# Patient Record
Sex: FEMALE | Race: WHITE | NOT HISPANIC OR LATINO | Employment: FULL TIME
[De-identification: names, ages, dates, MRNs, and addresses within clinical notes are randomized per-mention and may not be internally consistent; named-entity substitution may affect disease eponyms.]

---

## 2017-01-02 ENCOUNTER — CHARTING TRANS (OUTPATIENT)
Dept: OTHER | Age: 42
End: 2017-01-02

## 2017-05-02 ENCOUNTER — CHARTING TRANS (OUTPATIENT)
Dept: OTHER | Age: 42
End: 2017-05-02

## 2017-06-27 ENCOUNTER — HOSPITAL (OUTPATIENT)
Dept: OTHER | Age: 42
End: 2017-06-27
Attending: OBSTETRICS & GYNECOLOGY

## 2017-07-13 ENCOUNTER — PRIOR ORIGINAL RECORDS (OUTPATIENT)
Dept: OTHER | Age: 42
End: 2017-07-13

## 2017-07-13 ENCOUNTER — HOSPITAL (OUTPATIENT)
Dept: OTHER | Age: 42
End: 2017-07-13
Attending: RADIOLOGY

## 2017-07-17 ENCOUNTER — LAB SERVICES (OUTPATIENT)
Dept: OTHER | Age: 42
End: 2017-07-17

## 2017-07-17 ENCOUNTER — HOSPITAL (OUTPATIENT)
Dept: OTHER | Age: 42
End: 2017-07-17
Attending: INTERNAL MEDICINE

## 2017-07-17 LAB
ALBUMIN SERPL-MCNC: 3.7 GM/DL (ref 3.6–5.1)
ALBUMIN/GLOB SERPL: 0.8 {RATIO} (ref 1–2.4)
ALP SERPL-CCNC: 128 UNIT/L (ref 45–117)
ALT SERPL-CCNC: 31 UNIT/L
ANALYZER ANC (IANC): NORMAL
ANION GAP SERPL CALC-SCNC: 13 MMOL/L (ref 10–20)
AST SERPL-CCNC: 24 UNIT/L
BASOPHILS # BLD: 0 THOUSAND/MCL (ref 0–0.3)
BASOPHILS NFR BLD: 0 %
BILIRUB SERPL-MCNC: 0.4 MG/DL (ref 0.2–1)
BUN SERPL-MCNC: 14 MG/DL (ref 6–20)
BUN/CREAT SERPL: 20 (ref 7–25)
CALCIUM SERPL-MCNC: 9.8 MG/DL (ref 8.4–10.2)
CHLORIDE: 105 MMOL/L (ref 98–107)
CO2 SERPL-SCNC: 28 MMOL/L (ref 21–32)
CREAT SERPL-MCNC: 0.7 MG/DL (ref 0.51–0.95)
DIFFERENTIAL METHOD BLD: NORMAL
EOSINOPHIL # BLD: 0.1 THOUSAND/MCL (ref 0.1–0.5)
EOSINOPHIL NFR BLD: 1 %
ERYTHROCYTE [DISTWIDTH] IN BLOOD: 13.2 % (ref 11–15)
GLOBULIN SER-MCNC: 4.4 GM/DL (ref 2–4)
GLUCOSE SERPL-MCNC: 97 MG/DL (ref 65–99)
HEMATOCRIT: 42 % (ref 36–46.5)
HGB BLD-MCNC: 14.5 GM/DL (ref 12–15.5)
LENGTH OF FAST TIME PATIENT: ABNORMAL HR
LYMPHOCYTES # BLD: 1.9 THOUSAND/MCL (ref 1–4.8)
LYMPHOCYTES NFR BLD: 24 %
MCH RBC QN AUTO: 30.2 PG (ref 26–34)
MCHC RBC AUTO-ENTMCNC: 34.5 GM/DL (ref 32–36.5)
MCV RBC AUTO: 87.5 FL (ref 78–100)
MONOCYTES # BLD: 0.5 THOUSAND/MCL (ref 0.3–0.9)
MONOCYTES NFR BLD: 6 %
NEUTROPHILS # BLD: 5.4 THOUSAND/MCL (ref 1.8–7.7)
NEUTROPHILS NFR BLD: 69 %
NEUTS SEG NFR BLD: NORMAL %
PERCENT NRBC: NORMAL
PLATELET # BLD: 285 THOUSAND/MCL (ref 140–450)
POTASSIUM SERPL-SCNC: 4 MMOL/L (ref 3.4–5.1)
PROT SERPL-MCNC: 8.1 GM/DL (ref 6.4–8.2)
RBC # BLD: 4.8 MILLION/MCL (ref 4–5.2)
SODIUM SERPL-SCNC: 142 MMOL/L (ref 135–145)
WBC # BLD: 7.9 THOUSAND/MCL (ref 4.2–11)

## 2017-07-18 LAB
ALBUMIN SERPL-MCNC: 3.7 G/DL (ref 3.6–5.1)
ALBUMIN/GLOB SERPL: 0.8 (ref 1–2.4)
ALP SERPL-CCNC: 128 UNITS/L (ref 45–117)
ALT SERPL-CCNC: 31 UNITS/L
ANALYZER ANC (IANC): NORMAL
ANION GAP SERPL CALC-SCNC: 13 MMOL/L (ref 10–20)
AST SERPL-CCNC: 24 UNITS/L
BASOPHILS # BLD: 0 K/MCL (ref 0–0.3)
BASOPHILS NFR BLD: 0 %
BILIRUB SERPL-MCNC: 0.4 MG/DL (ref 0.2–1)
BUN SERPL-MCNC: 14 MG/DL (ref 6–20)
BUN/CREAT SERPL: 20 (ref 7–25)
CALCIUM SERPL-MCNC: 9.8 MG/DL (ref 8.4–10.2)
CHLORIDE SERPL-SCNC: 105 MMOL/L (ref 98–107)
CO2 SERPL-SCNC: 28 MMOL/L (ref 21–32)
CREAT SERPL-MCNC: 0.7 MG/DL (ref 0.51–0.95)
DIFFERENTIAL METHOD BLD: NORMAL
EOSINOPHIL # BLD: 0.1 K/MCL (ref 0.1–0.5)
EOSINOPHIL NFR BLD: 1 %
ERYTHROCYTE [DISTWIDTH] IN BLOOD: 13.2 % (ref 11–15)
GLOBULIN SER-MCNC: 4.4 G/DL (ref 2–4)
GLUCOSE SERPL-MCNC: 97 MG/DL (ref 65–99)
HEMATOCRIT: 42 % (ref 36–46.5)
HEMOGLOBIN: 14.5 G/DL (ref 12–15.5)
LENGTH OF FAST TIME PATIENT: ABNORMAL HRS
LYMPHOCYTES # BLD: 1.9 K/MCL (ref 1–4.8)
LYMPHOCYTES NFR BLD: 24 %
MEAN CORPUSCULAR HEMOGLOBIN: 30.2 PG (ref 26–34)
MEAN CORPUSCULAR HGB CONC: 34.5 G/DL (ref 32–36.5)
MEAN CORPUSCULAR VOLUME: 87.5 FL (ref 78–100)
MONOCYTES # BLD: 0.5 K/MCL (ref 0.3–0.9)
MONOCYTES NFR BLD: 6 %
NEUTROPHILS # BLD: 5.4 K/MCL (ref 1.8–7.7)
NEUTROPHILS NFR BLD: 69 %
NEUTS SEG NFR BLD: NORMAL
NRBC (NRBCRE): NORMAL
PLATELET COUNT: 285 K/MCL (ref 140–450)
POTASSIUM SERPL-SCNC: 4 MMOL/L (ref 3.4–5.1)
RED CELL COUNT: 4.8 MIL/MCL (ref 4–5.2)
SODIUM SERPL-SCNC: 142 MMOL/L (ref 135–145)
TOTAL PROTEIN: 8.1 G/DL (ref 6.4–8.2)
WHITE BLOOD COUNT: 7.9 K/MCL (ref 4.2–11)

## 2017-07-27 ENCOUNTER — HOSPITAL (OUTPATIENT)
Dept: OTHER | Age: 42
End: 2017-07-27
Attending: RADIOLOGY

## 2017-07-31 ENCOUNTER — PRIOR ORIGINAL RECORDS (OUTPATIENT)
Dept: OTHER | Age: 42
End: 2017-07-31

## 2017-08-01 ENCOUNTER — HOSPITAL (OUTPATIENT)
Dept: OTHER | Age: 42
End: 2017-08-01
Attending: RADIOLOGY

## 2017-08-07 ENCOUNTER — PRIOR ORIGINAL RECORDS (OUTPATIENT)
Dept: OTHER | Age: 42
End: 2017-08-07

## 2017-08-10 ENCOUNTER — PRIOR ORIGINAL RECORDS (OUTPATIENT)
Dept: OTHER | Age: 42
End: 2017-08-10

## 2017-08-17 ENCOUNTER — PRIOR ORIGINAL RECORDS (OUTPATIENT)
Dept: OTHER | Age: 42
End: 2017-08-17

## 2017-08-18 ENCOUNTER — PRIOR ORIGINAL RECORDS (OUTPATIENT)
Dept: OTHER | Age: 42
End: 2017-08-18

## 2017-08-21 ENCOUNTER — PRIOR ORIGINAL RECORDS (OUTPATIENT)
Dept: OTHER | Age: 42
End: 2017-08-21

## 2017-08-23 ENCOUNTER — PRIOR ORIGINAL RECORDS (OUTPATIENT)
Dept: OTHER | Age: 42
End: 2017-08-23

## 2017-08-28 ENCOUNTER — PRIOR ORIGINAL RECORDS (OUTPATIENT)
Dept: OTHER | Age: 42
End: 2017-08-28

## 2017-08-29 ENCOUNTER — PRIOR ORIGINAL RECORDS (OUTPATIENT)
Dept: OTHER | Age: 42
End: 2017-08-29

## 2017-09-01 ENCOUNTER — HOSPITAL (OUTPATIENT)
Dept: OTHER | Age: 42
End: 2017-09-01
Attending: RADIOLOGY

## 2017-09-05 ENCOUNTER — PRIOR ORIGINAL RECORDS (OUTPATIENT)
Dept: OTHER | Age: 42
End: 2017-09-05

## 2017-09-07 ENCOUNTER — PRIOR ORIGINAL RECORDS (OUTPATIENT)
Dept: OTHER | Age: 42
End: 2017-09-07

## 2017-09-11 ENCOUNTER — PRIOR ORIGINAL RECORDS (OUTPATIENT)
Dept: OTHER | Age: 42
End: 2017-09-11

## 2017-09-12 ENCOUNTER — PRIOR ORIGINAL RECORDS (OUTPATIENT)
Dept: OTHER | Age: 42
End: 2017-09-12

## 2017-09-14 ENCOUNTER — PRIOR ORIGINAL RECORDS (OUTPATIENT)
Dept: OTHER | Age: 42
End: 2017-09-14

## 2017-09-18 ENCOUNTER — PRIOR ORIGINAL RECORDS (OUTPATIENT)
Dept: OTHER | Age: 42
End: 2017-09-18

## 2017-09-21 ENCOUNTER — CHARTING TRANS (OUTPATIENT)
Dept: OTHER | Age: 42
End: 2017-09-21

## 2017-10-01 ENCOUNTER — HOSPITAL (OUTPATIENT)
Dept: OTHER | Age: 42
End: 2017-10-01
Attending: RADIOLOGY

## 2017-10-02 ENCOUNTER — PRIOR ORIGINAL RECORDS (OUTPATIENT)
Dept: OTHER | Age: 42
End: 2017-10-02

## 2018-01-02 ENCOUNTER — HOSPITAL (OUTPATIENT)
Dept: OTHER | Age: 43
End: 2018-01-02
Attending: PHYSICIAN ASSISTANT

## 2018-04-02 ENCOUNTER — HOSPITAL (OUTPATIENT)
Dept: OTHER | Age: 43
End: 2018-04-02
Attending: RADIOLOGY

## 2018-04-02 ENCOUNTER — PRIOR ORIGINAL RECORDS (OUTPATIENT)
Dept: OTHER | Age: 43
End: 2018-04-02

## 2018-04-11 ENCOUNTER — IMAGING SERVICES (OUTPATIENT)
Dept: OTHER | Age: 43
End: 2018-04-11

## 2018-04-11 ENCOUNTER — HOSPITAL (OUTPATIENT)
Dept: OTHER | Age: 43
End: 2018-04-11
Attending: INTERNAL MEDICINE

## 2018-04-29 ENCOUNTER — CHARTING TRANS (OUTPATIENT)
Dept: OTHER | Age: 43
End: 2018-04-29

## 2018-07-03 ENCOUNTER — HOSPITAL (OUTPATIENT)
Dept: OTHER | Age: 43
End: 2018-07-03
Attending: OBSTETRICS & GYNECOLOGY

## 2018-10-01 ENCOUNTER — PRIOR ORIGINAL RECORDS (OUTPATIENT)
Dept: OTHER | Age: 43
End: 2018-10-01

## 2018-10-01 ENCOUNTER — HOSPITAL (OUTPATIENT)
Dept: OTHER | Age: 43
End: 2018-10-01
Attending: RADIOLOGY

## 2018-11-03 VITALS
WEIGHT: 274 LBS | HEIGHT: 65 IN | BODY MASS INDEX: 45.65 KG/M2 | SYSTOLIC BLOOD PRESSURE: 130 MMHG | DIASTOLIC BLOOD PRESSURE: 90 MMHG | TEMPERATURE: 98.8 F | OXYGEN SATURATION: 98 % | RESPIRATION RATE: 18 BRPM | HEART RATE: 82 BPM

## 2018-12-01 ENCOUNTER — PRIOR ORIGINAL RECORDS (OUTPATIENT)
Dept: HEALTH INFORMATION MANAGEMENT | Facility: OTHER | Age: 43
End: 2018-12-01

## 2019-01-01 ENCOUNTER — EXTERNAL RECORD (OUTPATIENT)
Dept: HEALTH INFORMATION MANAGEMENT | Facility: OTHER | Age: 44
End: 2019-01-01

## 2019-02-23 ENCOUNTER — HOSPITAL (OUTPATIENT)
Dept: OTHER | Age: 44
End: 2019-02-23
Attending: OBSTETRICS & GYNECOLOGY

## 2019-04-04 ENCOUNTER — HOSPITAL (OUTPATIENT)
Dept: OTHER | Age: 44
End: 2019-04-04
Attending: RADIOLOGY

## 2019-04-22 ENCOUNTER — PRIOR ORIGINAL RECORDS (OUTPATIENT)
Dept: OTHER | Age: 44
End: 2019-04-22

## 2019-04-25 ENCOUNTER — HOSPITAL (OUTPATIENT)
Dept: OTHER | Age: 44
End: 2019-04-25
Attending: OBSTETRICS & GYNECOLOGY

## 2019-05-01 ENCOUNTER — HOSPITAL (OUTPATIENT)
Dept: OTHER | Age: 44
End: 2019-05-01
Attending: RADIOLOGY

## 2019-08-01 LAB
ANALYZER ANC (IANC): NORMAL
ERYTHROCYTE [DISTWIDTH] IN BLOOD: 13.2 % (ref 11–15)
HCT VFR BLD CALC: 38.7 % (ref 36–46.5)
HGB BLD-MCNC: 13.1 G/DL (ref 12–15.5)
INR PPP: 1
INR PPP: NORMAL
MCH RBC QN AUTO: 30.3 PG (ref 26–34)
MCHC RBC AUTO-ENTMCNC: 33.9 G/DL (ref 32–36.5)
MCV RBC AUTO: 89.6 FL (ref 78–100)
NRBC (NRBCRE): 0 /100 WBC
PLATELET # BLD: 250 K/MCL (ref 140–450)
PROTHROMBIN TIME (PRT2): NORMAL
PROTHROMBIN TIME: 10.5 SEC (ref 9.7–11.8)
RBC # BLD: 4.32 MIL/MCL (ref 4–5.2)
WBC # BLD: 5.1 K/MCL (ref 4.2–11)

## 2019-08-05 LAB — PATH REPORT, FNA: NORMAL

## 2019-08-09 ENCOUNTER — PRIOR ORIGINAL RECORDS (OUTPATIENT)
Dept: OTHER | Age: 44
End: 2019-08-09

## 2019-08-09 ENCOUNTER — HOSPITAL (OUTPATIENT)
Dept: OTHER | Age: 44
End: 2019-08-09
Attending: RADIOLOGY

## 2019-08-12 ENCOUNTER — HOSPITAL (OUTPATIENT)
Dept: OTHER | Age: 44
End: 2019-08-12
Attending: OBSTETRICS & GYNECOLOGY

## 2019-09-01 ENCOUNTER — HOSPITAL (OUTPATIENT)
Dept: OTHER | Age: 44
End: 2019-09-01
Attending: RADIOLOGY

## 2019-09-11 ENCOUNTER — HOSPITAL (OUTPATIENT)
Dept: OTHER | Age: 44
End: 2019-09-11
Attending: OBSTETRICS & GYNECOLOGY

## 2019-09-11 ENCOUNTER — PRIOR ORIGINAL RECORDS (OUTPATIENT)
Dept: OTHER | Age: 44
End: 2019-09-11

## 2019-09-26 ENCOUNTER — PRIOR ORIGINAL RECORDS (OUTPATIENT)
Dept: OTHER | Age: 44
End: 2019-09-26

## 2019-10-01 ENCOUNTER — HOSPITAL (OUTPATIENT)
Dept: OTHER | Age: 44
End: 2019-10-01
Attending: RADIOLOGY

## 2019-10-01 ENCOUNTER — PRIOR ORIGINAL RECORDS (OUTPATIENT)
Dept: OTHER | Age: 44
End: 2019-10-01

## 2019-10-07 ENCOUNTER — PRIOR ORIGINAL RECORDS (OUTPATIENT)
Dept: OTHER | Age: 44
End: 2019-10-07

## 2019-10-09 ENCOUNTER — PRIOR ORIGINAL RECORDS (OUTPATIENT)
Dept: OTHER | Age: 44
End: 2019-10-09

## 2019-10-14 ENCOUNTER — PRIOR ORIGINAL RECORDS (OUTPATIENT)
Dept: OTHER | Age: 44
End: 2019-10-14

## 2019-10-21 ENCOUNTER — PRIOR ORIGINAL RECORDS (OUTPATIENT)
Dept: OTHER | Age: 44
End: 2019-10-21

## 2019-10-28 ENCOUNTER — PRIOR ORIGINAL RECORDS (OUTPATIENT)
Dept: OTHER | Age: 44
End: 2019-10-28

## 2019-11-01 ENCOUNTER — HOSPITAL (OUTPATIENT)
Dept: OTHER | Age: 44
End: 2019-11-01
Attending: RADIOLOGY

## 2019-11-01 ENCOUNTER — PRIOR ORIGINAL RECORDS (OUTPATIENT)
Dept: OTHER | Age: 44
End: 2019-11-01

## 2019-11-04 ENCOUNTER — PRIOR ORIGINAL RECORDS (OUTPATIENT)
Dept: OTHER | Age: 44
End: 2019-11-04

## 2019-11-11 ENCOUNTER — PRIOR ORIGINAL RECORDS (OUTPATIENT)
Dept: OTHER | Age: 44
End: 2019-11-11

## 2019-12-01 ENCOUNTER — HOSPITAL (OUTPATIENT)
Dept: OTHER | Age: 44
End: 2019-12-01
Attending: RADIOLOGY

## 2019-12-02 ENCOUNTER — PRIOR ORIGINAL RECORDS (OUTPATIENT)
Dept: OTHER | Age: 44
End: 2019-12-02

## 2019-12-02 ENCOUNTER — OFFICE VISIT (OUTPATIENT)
Dept: PODIATRY CLINIC | Facility: CLINIC | Age: 44
End: 2019-12-02
Payer: COMMERCIAL

## 2019-12-02 VITALS — HEART RATE: 77 BPM | SYSTOLIC BLOOD PRESSURE: 130 MMHG | DIASTOLIC BLOOD PRESSURE: 88 MMHG | RESPIRATION RATE: 12 BRPM

## 2019-12-02 DIAGNOSIS — M79.672 INFLAMMATORY HEEL PAIN, LEFT: ICD-10-CM

## 2019-12-02 DIAGNOSIS — M72.2 PLANTAR FASCIITIS OF LEFT FOOT: Primary | ICD-10-CM

## 2019-12-02 DIAGNOSIS — M77.32 CALCANEAL SPUR OF LEFT FOOT: ICD-10-CM

## 2019-12-02 DIAGNOSIS — M79.672 ARCH PAIN, LEFT: ICD-10-CM

## 2019-12-02 PROCEDURE — 99203 OFFICE O/P NEW LOW 30 MIN: CPT | Performed by: PODIATRIST

## 2019-12-02 PROCEDURE — 20550 NJX 1 TENDON SHEATH/LIGAMENT: CPT | Performed by: PODIATRIST

## 2019-12-02 RX ORDER — LETROZOLE 2.5 MG/1
2.5 TABLET, FILM COATED ORAL
Refills: 4 | COMMUNITY
Start: 2019-11-07

## 2019-12-02 RX ORDER — TRIAMCINOLONE ACETONIDE 40 MG/ML
40 INJECTION, SUSPENSION INTRA-ARTICULAR; INTRAMUSCULAR ONCE
Status: DISCONTINUED | OUTPATIENT
Start: 2019-12-02 | End: 2019-12-02

## 2019-12-02 RX ORDER — TRIAMCINOLONE ACETONIDE 40 MG/ML
40 INJECTION, SUSPENSION INTRA-ARTICULAR; INTRAMUSCULAR ONCE
Status: COMPLETED | OUTPATIENT
Start: 2019-12-02 | End: 2019-12-02

## 2019-12-02 RX ADMIN — TRIAMCINOLONE ACETONIDE 40 MG: 40 INJECTION, SUSPENSION INTRA-ARTICULAR; INTRAMUSCULAR at 17:50:00

## 2019-12-03 ENCOUNTER — TELEPHONE (OUTPATIENT)
Dept: PODIATRY CLINIC | Facility: CLINIC | Age: 44
End: 2019-12-03

## 2019-12-03 NOTE — PROGRESS NOTES
Freddie Georges is a 38year old female. Patient presents with:  Consult: left heel pain -- Onset 2 weeks ago. States heels feels like a \"pebble in heel\". Foot is achy. No xrays taken. Rates pain 2/10 today. Tried icing BID.          HPI:   This pleasan denies chest pain on exertion  GI: denies abdominal pain and denies heartburn  NEURO: denies headaches    EXAM:   /88 (BP Location: Left arm, Patient Position: Sitting, Cuff Size: large)   Pulse 77   Resp 12   Physical Exam  GENERAL: well developed, Future        Plan: Today educated the patient about the problem ordered physical therapy and advised cortisone injection. The patient was consented for a cortisone injection and after timeout was taken the injection consisting of 40 mg Kenalog and 1.0 cc o

## 2019-12-03 NOTE — PROGRESS NOTES
Per Dr. Tyler Ortiz draw up 1 cc of 0.5 % Marcaine and 1 cc of Kenalog 40 for injection to left foot.   RR

## 2019-12-03 NOTE — TELEPHONE ENCOUNTER
LM for patient that the xray orders are entered and to get the xrays done on the way into her next appt.  pls call back with any questions

## 2019-12-21 ENCOUNTER — HOSPITAL ENCOUNTER (OUTPATIENT)
Dept: GENERAL RADIOLOGY | Age: 44
Discharge: HOME | End: 2019-12-21
Attending: PODIATRIST
Payer: COMMERCIAL

## 2019-12-21 DIAGNOSIS — M77.32 CALCANEAL SPUR OF LEFT FOOT: ICD-10-CM

## 2019-12-21 DIAGNOSIS — M79.672 INFLAMMATORY HEEL PAIN, LEFT: ICD-10-CM

## 2019-12-21 DIAGNOSIS — M79.672 ARCH PAIN, LEFT: ICD-10-CM

## 2019-12-21 DIAGNOSIS — M72.2 PLANTAR FASCIITIS OF LEFT FOOT: ICD-10-CM

## 2019-12-21 PROCEDURE — 73620 X-RAY EXAM OF FOOT: CPT | Performed by: PODIATRIST

## 2019-12-23 ENCOUNTER — HOSPITAL (OUTPATIENT)
Dept: OTHER | Age: 44
End: 2019-12-23
Attending: OBSTETRICS & GYNECOLOGY

## 2019-12-30 ENCOUNTER — TELEPHONE (OUTPATIENT)
Dept: PODIATRY CLINIC | Facility: CLINIC | Age: 44
End: 2019-12-30

## 2019-12-30 ENCOUNTER — OFFICE VISIT (OUTPATIENT)
Dept: PODIATRY CLINIC | Facility: CLINIC | Age: 44
End: 2019-12-30
Payer: COMMERCIAL

## 2019-12-30 DIAGNOSIS — M77.32 CALCANEAL SPUR OF LEFT FOOT: ICD-10-CM

## 2019-12-30 DIAGNOSIS — M79.672 INFLAMMATORY HEEL PAIN, LEFT: ICD-10-CM

## 2019-12-30 DIAGNOSIS — M79.672 ARCH PAIN, LEFT: ICD-10-CM

## 2019-12-30 DIAGNOSIS — M72.2 PLANTAR FASCIITIS OF LEFT FOOT: Primary | ICD-10-CM

## 2019-12-30 PROCEDURE — 99213 OFFICE O/P EST LOW 20 MIN: CPT | Performed by: PODIATRIST

## 2019-12-30 NOTE — TELEPHONE ENCOUNTER
Lm requesting call back about appt today.     (if pt calls back, she and her mother can come in as early as 26 d/t a cancellation.  They can come in as soon as possible)

## 2019-12-30 NOTE — TELEPHONE ENCOUNTER
Pt returned the call. Pt is unable to come in early today for the appointment. Pt is at work and after going to  her mother for their appointments.

## 2019-12-31 NOTE — PROGRESS NOTES
Kenzie Darnell is a 38year old female. Patient presents with: Follow - Up: LOV 12/2/19. pt had cortisone injection at LOV and foot pain is much better. feels more like a tired arch and tight calf muscle. 95% better.  0/10 pain,        HPI:   Patient com nails are of normal appearance. 2. Vascular: Patient has palpable dorsalis pedis posterior tibial pulses on the left   3. Neurologic: Patient has intact pain sensation   4.  Musculoskeletal: Patient has pain on palpation of the arch but only when stretche

## 2020-01-01 ENCOUNTER — EXTERNAL RECORD (OUTPATIENT)
Dept: HEALTH INFORMATION MANAGEMENT | Facility: OTHER | Age: 45
End: 2020-01-01

## 2020-01-01 ENCOUNTER — HOSPITAL (OUTPATIENT)
Dept: OTHER | Age: 45
End: 2020-01-01
Attending: RADIOLOGY

## 2020-01-01 ENCOUNTER — EXTERNAL RECORD (OUTPATIENT)
Dept: HEALTH INFORMATION MANAGEMENT | Age: 45
End: 2020-01-01

## 2020-01-07 ENCOUNTER — APPOINTMENT (OUTPATIENT)
Dept: PHYSICAL THERAPY | Age: 45
End: 2020-01-07
Attending: PODIATRIST
Payer: COMMERCIAL

## 2020-01-09 ENCOUNTER — APPOINTMENT (OUTPATIENT)
Dept: PHYSICAL THERAPY | Age: 45
End: 2020-01-09
Attending: PODIATRIST
Payer: COMMERCIAL

## 2020-01-14 ENCOUNTER — APPOINTMENT (OUTPATIENT)
Dept: PHYSICAL THERAPY | Age: 45
End: 2020-01-14
Attending: PODIATRIST
Payer: COMMERCIAL

## 2020-01-16 ENCOUNTER — APPOINTMENT (OUTPATIENT)
Dept: PHYSICAL THERAPY | Age: 45
End: 2020-01-16
Attending: PODIATRIST
Payer: COMMERCIAL

## 2020-01-21 ENCOUNTER — APPOINTMENT (OUTPATIENT)
Dept: PHYSICAL THERAPY | Age: 45
End: 2020-01-21
Attending: PODIATRIST
Payer: COMMERCIAL

## 2020-01-23 ENCOUNTER — APPOINTMENT (OUTPATIENT)
Dept: PHYSICAL THERAPY | Age: 45
End: 2020-01-23
Attending: PODIATRIST
Payer: COMMERCIAL

## 2020-01-28 ENCOUNTER — APPOINTMENT (OUTPATIENT)
Dept: PHYSICAL THERAPY | Age: 45
End: 2020-01-28
Attending: PODIATRIST
Payer: COMMERCIAL

## 2020-01-30 ENCOUNTER — APPOINTMENT (OUTPATIENT)
Dept: PHYSICAL THERAPY | Age: 45
End: 2020-01-30
Attending: PODIATRIST
Payer: COMMERCIAL

## 2020-02-09 VITALS
BODY MASS INDEX: 46.75 KG/M2 | HEIGHT: 64 IN | WEIGHT: 273.81 LBS | WEIGHT: 273.81 LBS | TEMPERATURE: 98.2 F | DIASTOLIC BLOOD PRESSURE: 74 MMHG | BODY MASS INDEX: 47 KG/M2 | SYSTOLIC BLOOD PRESSURE: 113 MMHG | RESPIRATION RATE: 18 BRPM | HEART RATE: 60 BPM

## 2020-02-09 VITALS
DIASTOLIC BLOOD PRESSURE: 78 MMHG | HEART RATE: 66 BPM | WEIGHT: 267.64 LBS | HEIGHT: 64 IN | RESPIRATION RATE: 20 BRPM | BODY MASS INDEX: 45.69 KG/M2 | SYSTOLIC BLOOD PRESSURE: 120 MMHG

## 2020-02-09 VITALS
RESPIRATION RATE: 18 BRPM | DIASTOLIC BLOOD PRESSURE: 81 MMHG | SYSTOLIC BLOOD PRESSURE: 123 MMHG | HEIGHT: 64 IN | WEIGHT: 275.58 LBS | HEART RATE: 82 BPM | BODY MASS INDEX: 47.05 KG/M2

## 2020-02-09 VITALS
WEIGHT: 272.05 LBS | SYSTOLIC BLOOD PRESSURE: 112 MMHG | TEMPERATURE: 98.2 F | RESPIRATION RATE: 20 BRPM | BODY MASS INDEX: 46.45 KG/M2 | HEART RATE: 84 BPM | DIASTOLIC BLOOD PRESSURE: 81 MMHG | HEIGHT: 64 IN

## 2020-02-09 VITALS
DIASTOLIC BLOOD PRESSURE: 77 MMHG | RESPIRATION RATE: 20 BRPM | HEART RATE: 62 BPM | BODY MASS INDEX: 45.5 KG/M2 | SYSTOLIC BLOOD PRESSURE: 137 MMHG | HEIGHT: 64 IN | WEIGHT: 266.54 LBS

## 2020-02-09 VITALS
DIASTOLIC BLOOD PRESSURE: 79 MMHG | BODY MASS INDEX: 46.75 KG/M2 | WEIGHT: 273.81 LBS | HEIGHT: 64 IN | TEMPERATURE: 98.2 F | SYSTOLIC BLOOD PRESSURE: 126 MMHG | HEART RATE: 67 BPM | RESPIRATION RATE: 19 BRPM

## 2020-02-09 VITALS
HEART RATE: 59 BPM | WEIGHT: 269.62 LBS | SYSTOLIC BLOOD PRESSURE: 135 MMHG | RESPIRATION RATE: 20 BRPM | BODY MASS INDEX: 44.87 KG/M2 | DIASTOLIC BLOOD PRESSURE: 79 MMHG

## 2020-02-09 VITALS
SYSTOLIC BLOOD PRESSURE: 136 MMHG | DIASTOLIC BLOOD PRESSURE: 79 MMHG | BODY MASS INDEX: 44.43 KG/M2 | WEIGHT: 266.98 LBS | HEART RATE: 61 BPM | RESPIRATION RATE: 20 BRPM

## 2020-02-09 VITALS — WEIGHT: 269.84 LBS | BODY MASS INDEX: 44.9 KG/M2

## 2020-02-09 VITALS
DIASTOLIC BLOOD PRESSURE: 56 MMHG | HEART RATE: 57 BPM | RESPIRATION RATE: 19 BRPM | HEIGHT: 64 IN | BODY MASS INDEX: 46.07 KG/M2 | WEIGHT: 269.84 LBS | SYSTOLIC BLOOD PRESSURE: 100 MMHG

## 2020-02-09 VITALS
BODY MASS INDEX: 45.99 KG/M2 | HEIGHT: 64 IN | SYSTOLIC BLOOD PRESSURE: 111 MMHG | RESPIRATION RATE: 20 BRPM | HEART RATE: 62 BPM | WEIGHT: 269.4 LBS | DIASTOLIC BLOOD PRESSURE: 68 MMHG

## 2020-02-09 VITALS — WEIGHT: 266.76 LBS | HEIGHT: 64 IN | BODY MASS INDEX: 45.54 KG/M2

## 2020-02-09 VITALS
BODY MASS INDEX: 46.71 KG/M2 | SYSTOLIC BLOOD PRESSURE: 146 MMHG | RESPIRATION RATE: 20 BRPM | HEIGHT: 64 IN | HEART RATE: 64 BPM | WEIGHT: 273.59 LBS | DIASTOLIC BLOOD PRESSURE: 87 MMHG

## 2020-02-09 VITALS
RESPIRATION RATE: 20 BRPM | BODY MASS INDEX: 47.27 KG/M2 | WEIGHT: 276.9 LBS | DIASTOLIC BLOOD PRESSURE: 88 MMHG | SYSTOLIC BLOOD PRESSURE: 136 MMHG | HEART RATE: 66 BPM | HEIGHT: 64 IN

## 2020-02-09 VITALS
HEART RATE: 75 BPM | DIASTOLIC BLOOD PRESSURE: 76 MMHG | SYSTOLIC BLOOD PRESSURE: 143 MMHG | RESPIRATION RATE: 20 BRPM | HEIGHT: 64 IN | BODY MASS INDEX: 45.5 KG/M2 | WEIGHT: 266.54 LBS

## 2020-02-09 VITALS
SYSTOLIC BLOOD PRESSURE: 136 MMHG | HEIGHT: 64 IN | HEART RATE: 66 BPM | BODY MASS INDEX: 46.75 KG/M2 | RESPIRATION RATE: 20 BRPM | DIASTOLIC BLOOD PRESSURE: 83 MMHG | TEMPERATURE: 98.2 F | WEIGHT: 273.81 LBS

## 2020-02-09 VITALS
WEIGHT: 269.4 LBS | SYSTOLIC BLOOD PRESSURE: 134 MMHG | HEART RATE: 66 BPM | BODY MASS INDEX: 45.99 KG/M2 | HEIGHT: 64 IN | DIASTOLIC BLOOD PRESSURE: 70 MMHG | RESPIRATION RATE: 20 BRPM

## 2020-02-09 VITALS
RESPIRATION RATE: 20 BRPM | BODY MASS INDEX: 45.62 KG/M2 | SYSTOLIC BLOOD PRESSURE: 129 MMHG | DIASTOLIC BLOOD PRESSURE: 71 MMHG | HEART RATE: 71 BPM | HEIGHT: 64 IN | WEIGHT: 267.2 LBS

## 2020-02-09 VITALS — WEIGHT: 122.4 LBS | BODY MASS INDEX: 20.37 KG/M2

## 2020-02-09 VITALS
HEART RATE: 64 BPM | RESPIRATION RATE: 19 BRPM | HEIGHT: 64 IN | WEIGHT: 266.54 LBS | BODY MASS INDEX: 45.5 KG/M2 | DIASTOLIC BLOOD PRESSURE: 81 MMHG | SYSTOLIC BLOOD PRESSURE: 120 MMHG

## 2020-02-09 VITALS
WEIGHT: 273.15 LBS | BODY MASS INDEX: 46.63 KG/M2 | RESPIRATION RATE: 20 BRPM | HEIGHT: 64 IN | DIASTOLIC BLOOD PRESSURE: 82 MMHG | HEART RATE: 63 BPM | SYSTOLIC BLOOD PRESSURE: 125 MMHG

## 2020-02-09 VITALS
SYSTOLIC BLOOD PRESSURE: 138 MMHG | WEIGHT: 125.6 LBS | RESPIRATION RATE: 20 BRPM | HEART RATE: 61 BPM | BODY MASS INDEX: 21.44 KG/M2 | DIASTOLIC BLOOD PRESSURE: 79 MMHG | HEIGHT: 64 IN

## 2020-02-09 VITALS
RESPIRATION RATE: 20 BRPM | HEART RATE: 77 BPM | BODY MASS INDEX: 47.8 KG/M2 | HEIGHT: 64 IN | SYSTOLIC BLOOD PRESSURE: 124 MMHG | WEIGHT: 279.98 LBS | DIASTOLIC BLOOD PRESSURE: 82 MMHG

## 2020-02-09 VITALS
DIASTOLIC BLOOD PRESSURE: 77 MMHG | HEART RATE: 80 BPM | BODY MASS INDEX: 45.5 KG/M2 | SYSTOLIC BLOOD PRESSURE: 134 MMHG | WEIGHT: 266.54 LBS | RESPIRATION RATE: 20 BRPM | HEIGHT: 64 IN

## 2020-02-09 VITALS — WEIGHT: 125.6 LBS | BODY MASS INDEX: 20.9 KG/M2

## 2020-07-31 ENCOUNTER — HOSPITAL (OUTPATIENT)
Dept: OTHER | Age: 45
End: 2020-07-31
Attending: RADIOLOGY

## 2020-08-01 ENCOUNTER — HOSPITAL (OUTPATIENT)
Dept: OTHER | Age: 45
End: 2020-08-01
Attending: RADIOLOGY

## 2020-08-31 ENCOUNTER — HOSPITAL ENCOUNTER (OUTPATIENT)
Dept: PET IMAGING | Age: 45
Discharge: HOME | End: 2020-08-31
Attending: OBSTETRICS & GYNECOLOGY

## 2020-08-31 DIAGNOSIS — C54.1 ENDOMETRIAL CANCER (CMD): ICD-10-CM

## 2020-08-31 PROCEDURE — 78815 PET IMAGE W/CT SKULL-THIGH: CPT

## 2020-08-31 PROCEDURE — A9552 F18 FDG: HCPCS

## 2020-08-31 PROCEDURE — 10006150 HB RX 343

## 2020-08-31 RX ORDER — FLUDEOXYGLUCOSE F-18 300 MCI/ML
12.56 INJECTION INTRAVENOUS ONCE
Status: COMPLETED | OUTPATIENT
Start: 2020-08-31 | End: 2020-08-31

## 2020-08-31 RX ADMIN — FLUDEOXYGLUCOSE F-18 12.56 MILLICURIE: 300 INJECTION INTRAVENOUS at 07:00

## 2020-10-08 ENCOUNTER — HOSPITAL ENCOUNTER (OUTPATIENT)
Dept: MAMMOGRAPHY | Age: 45
Discharge: HOME | End: 2020-10-08
Attending: OBSTETRICS & GYNECOLOGY

## 2020-10-08 DIAGNOSIS — Z12.31 ENCOUNTER FOR SCREENING MAMMOGRAM FOR MALIGNANT NEOPLASM OF BREAST: ICD-10-CM

## 2020-10-08 PROCEDURE — 77063 BREAST TOMOSYNTHESIS BI: CPT

## 2020-12-02 ENCOUNTER — TELEPHONE (OUTPATIENT)
Dept: SCHEDULING | Age: 45
End: 2020-12-02

## 2020-12-05 ENCOUNTER — APPOINTMENT (OUTPATIENT)
Dept: CT IMAGING | Age: 45
End: 2020-12-05
Attending: OBSTETRICS & GYNECOLOGY

## 2020-12-14 ENCOUNTER — HOSPITAL ENCOUNTER (OUTPATIENT)
Dept: CT IMAGING | Age: 45
Discharge: HOME | End: 2020-12-14
Attending: OBSTETRICS & GYNECOLOGY

## 2020-12-14 ENCOUNTER — HOSPITAL ENCOUNTER (OUTPATIENT)
Dept: LAB | Age: 45
Discharge: HOME | End: 2020-12-14
Attending: OBSTETRICS & GYNECOLOGY

## 2020-12-14 ENCOUNTER — APPOINTMENT (OUTPATIENT)
Dept: CT IMAGING | Age: 45
End: 2020-12-14
Attending: OBSTETRICS & GYNECOLOGY

## 2020-12-14 DIAGNOSIS — C54.1 ENDOMETRIAL ADENOCARCINOMA (CMD): ICD-10-CM

## 2020-12-14 DIAGNOSIS — C54.1 ENDOMETRIAL/UTERINE ADENOCARCINOMA (CMD): Primary | ICD-10-CM

## 2020-12-14 DIAGNOSIS — C54.1 ENDOMETRIAL/UTERINE ADENOCARCINOMA (CMD): ICD-10-CM

## 2020-12-14 LAB
BUN SERPL-MCNC: 15 MG/DL (ref 6–20)
BUN/CREAT SERPL: 20 (ref 7–25)
CREAT SERPL-MCNC: 0.74 MG/DL (ref 0.51–0.95)
GFR SERPLBLD BASED ON 1.73 SQ M-ARVRAT: >90 ML/MIN/1.73M2

## 2020-12-14 PROCEDURE — 84520 ASSAY OF UREA NITROGEN: CPT | Performed by: OBSTETRICS & GYNECOLOGY

## 2020-12-14 PROCEDURE — 10002805 HB CONTRAST AGENT: Performed by: OBSTETRICS & GYNECOLOGY

## 2020-12-14 PROCEDURE — 36415 COLL VENOUS BLD VENIPUNCTURE: CPT | Performed by: OBSTETRICS & GYNECOLOGY

## 2020-12-14 PROCEDURE — 74177 CT ABD & PELVIS W/CONTRAST: CPT

## 2020-12-14 PROCEDURE — 71260 CT THORAX DX C+: CPT

## 2020-12-14 RX ADMIN — IOHEXOL 100 ML: 350 INJECTION, SOLUTION INTRAVENOUS at 09:45

## 2021-01-01 ENCOUNTER — EXTERNAL RECORD (OUTPATIENT)
Dept: HEALTH INFORMATION MANAGEMENT | Facility: OTHER | Age: 46
End: 2021-01-01

## 2021-03-27 ENCOUNTER — HOSPITAL ENCOUNTER (OUTPATIENT)
Dept: CT IMAGING | Age: 46
Discharge: HOME | End: 2021-03-27
Attending: OBSTETRICS & GYNECOLOGY

## 2021-03-27 ENCOUNTER — HOSPITAL ENCOUNTER (OUTPATIENT)
Dept: LAB | Age: 46
Discharge: HOME | End: 2021-03-27
Attending: OBSTETRICS & GYNECOLOGY

## 2021-03-27 DIAGNOSIS — C54.1 ENDOMETRIAL/UTERINE ADENOCARCINOMA (CMD): Primary | ICD-10-CM

## 2021-03-27 DIAGNOSIS — C54.1 ENDOMETRIAL ADENOCARCINOMA (CMD): ICD-10-CM

## 2021-03-27 DIAGNOSIS — C54.1 ENDOMETRIAL/UTERINE ADENOCARCINOMA (CMD): ICD-10-CM

## 2021-03-27 LAB
BUN SERPL-MCNC: 16 MG/DL (ref 6–20)
BUN/CREAT SERPL: 21 (ref 7–25)
CREAT SERPL-MCNC: 0.77 MG/DL (ref 0.51–0.95)
GFR SERPLBLD BASED ON 1.73 SQ M-ARVRAT: >90 ML/MIN/1.73M2

## 2021-03-27 PROCEDURE — 82565 ASSAY OF CREATININE: CPT | Performed by: CLINICAL MEDICAL LABORATORY

## 2021-03-27 PROCEDURE — 36415 COLL VENOUS BLD VENIPUNCTURE: CPT | Performed by: CLINICAL MEDICAL LABORATORY

## 2021-03-27 PROCEDURE — 74177 CT ABD & PELVIS W/CONTRAST: CPT

## 2021-03-27 PROCEDURE — 84520 ASSAY OF UREA NITROGEN: CPT | Performed by: CLINICAL MEDICAL LABORATORY

## 2021-03-27 PROCEDURE — 10002805 HB CONTRAST AGENT: Performed by: OBSTETRICS & GYNECOLOGY

## 2021-03-27 RX ADMIN — IOHEXOL 100 ML: 350 INJECTION, SOLUTION INTRAVENOUS at 09:45

## 2021-04-08 ENCOUNTER — IMMUNIZATION (OUTPATIENT)
Dept: LAB | Age: 46
End: 2021-04-08

## 2021-04-08 DIAGNOSIS — Z23 NEED FOR VACCINATION: Primary | ICD-10-CM

## 2021-04-08 PROCEDURE — 0001A COVID 19 PFIZER-BIONTECH: CPT

## 2021-04-08 PROCEDURE — 91300 COVID 19 PFIZER-BIONTECH: CPT

## 2021-04-29 ENCOUNTER — IMMUNIZATION (OUTPATIENT)
Dept: LAB | Age: 46
End: 2021-04-29
Attending: HOSPITALIST

## 2021-04-29 DIAGNOSIS — Z23 NEED FOR VACCINATION: Primary | ICD-10-CM

## 2021-04-29 PROCEDURE — 91300 COVID 19 PFIZER-BIONTECH: CPT | Performed by: HOSPITALIST

## 2021-04-29 PROCEDURE — 0002A COVID 19 PFIZER-BIONTECH: CPT | Performed by: HOSPITALIST

## 2021-06-26 ENCOUNTER — HOSPITAL ENCOUNTER (OUTPATIENT)
Dept: CT IMAGING | Age: 46
Discharge: HOME | End: 2021-06-26
Attending: OBSTETRICS & GYNECOLOGY

## 2021-06-26 DIAGNOSIS — R91.8 PULMONARY MASS: ICD-10-CM

## 2021-06-26 DIAGNOSIS — C54.1 ENDOMETRIAL ADENOCARCINOMA (CMD): ICD-10-CM

## 2021-06-26 PROCEDURE — 10002805 HB CONTRAST AGENT: Performed by: OBSTETRICS & GYNECOLOGY

## 2021-06-26 PROCEDURE — 71260 CT THORAX DX C+: CPT

## 2021-06-26 RX ADMIN — IOHEXOL 85 ML: 350 INJECTION, SOLUTION INTRAVENOUS at 09:28

## 2021-08-08 ENCOUNTER — E-ADVICE (OUTPATIENT)
Dept: INTERNAL MEDICINE | Age: 46
End: 2021-08-08

## 2021-08-08 DIAGNOSIS — S63.609A SPRAIN OF THUMB, UNSPECIFIED LATERALITY, UNSPECIFIED SITE OF DIGIT, INITIAL ENCOUNTER: Primary | ICD-10-CM

## 2021-09-24 ENCOUNTER — HOSPITAL ENCOUNTER (OUTPATIENT)
Dept: CT IMAGING | Age: 46
Discharge: HOME | End: 2021-09-24
Attending: OBSTETRICS & GYNECOLOGY

## 2021-09-24 DIAGNOSIS — R22.2 MASS OF ANTERIOR ABDOMINAL WALL: ICD-10-CM

## 2021-09-24 DIAGNOSIS — C54.1 ENDOMETRIAL ADENOCARCINOMA (CMD): ICD-10-CM

## 2021-09-24 DIAGNOSIS — Z80.3 FAMILY HISTORY OF BREAST CANCER: ICD-10-CM

## 2021-09-24 DIAGNOSIS — R93.5 ABNORMAL CT OF THE ABDOMEN: ICD-10-CM

## 2021-09-24 PROCEDURE — 10002805 HB CONTRAST AGENT: Performed by: OBSTETRICS & GYNECOLOGY

## 2021-09-24 PROCEDURE — 74177 CT ABD & PELVIS W/CONTRAST: CPT

## 2021-09-24 PROCEDURE — 71260 CT THORAX DX C+: CPT

## 2021-09-24 RX ADMIN — IOHEXOL 100 ML: 350 INJECTION, SOLUTION INTRAVENOUS at 08:52

## 2021-11-13 ENCOUNTER — HOSPITAL ENCOUNTER (OUTPATIENT)
Dept: MAMMOGRAPHY | Age: 46
Discharge: HOME | End: 2021-11-13
Attending: OBSTETRICS & GYNECOLOGY

## 2021-11-13 DIAGNOSIS — Z80.3 FAMILY HISTORY OF BREAST CANCER: ICD-10-CM

## 2021-11-13 DIAGNOSIS — Z12.31 ENCOUNTER FOR SCREENING MAMMOGRAM FOR MALIGNANT NEOPLASM OF BREAST: ICD-10-CM

## 2021-11-13 PROCEDURE — 77063 BREAST TOMOSYNTHESIS BI: CPT

## 2021-12-25 ENCOUNTER — HOSPITAL ENCOUNTER (OUTPATIENT)
Age: 46
Discharge: HOME | End: 2021-12-25
Payer: COMMERCIAL

## 2021-12-25 VITALS
HEART RATE: 64 BPM | SYSTOLIC BLOOD PRESSURE: 156 MMHG | OXYGEN SATURATION: 97 % | DIASTOLIC BLOOD PRESSURE: 70 MMHG | RESPIRATION RATE: 18 BRPM | TEMPERATURE: 99 F

## 2021-12-25 DIAGNOSIS — J02.0 STREPTOCOCCAL SORE THROAT: Primary | ICD-10-CM

## 2021-12-25 LAB
S PYO AG THROAT QL: POSITIVE
SARS-COV-2 RNA RESP QL NAA+PROBE: NOT DETECTED

## 2021-12-25 PROCEDURE — 99213 OFFICE O/P EST LOW 20 MIN: CPT

## 2021-12-25 PROCEDURE — 99204 OFFICE O/P NEW MOD 45 MIN: CPT

## 2021-12-25 PROCEDURE — 87880 STREP A ASSAY W/OPTIC: CPT

## 2021-12-25 RX ORDER — AMOXICILLIN 500 MG/1
500 TABLET, FILM COATED ORAL 2 TIMES DAILY
Qty: 20 TABLET | Refills: 0 | Status: SHIPPED | OUTPATIENT
Start: 2021-12-25 | End: 2022-01-04

## 2021-12-25 NOTE — ED PROVIDER NOTES
Patient Seen in: [facility]    History   Patient presents with:  Sore Throat    Stated Complaint: sore throat and ear pain (both)    ABIEL    Valerie Barrientos is a 51year old female who presents to immediate care requesting testing for COVID-1 Temporal   SpO2 97 %   O2 Device None (Room air)       Current:/70   Pulse 64   Temp 98.6 °F (37 °C) (Temporal)   Resp 18   SpO2 97%     PULSE OX 97% on RA   Constitutional: The patient is cooperative. Appears well-developed and well-nourished.   No a soft palatal fullness or unilateral tonsillar swelling to indicate tonsillar abscess. No meningsmus or trismus. No dysphagia or difficulty handing secretions. No dental pain. Afebrile, nontoxic appearing and does not meet SIRS criteria.   Rapid strep test

## 2022-04-02 ENCOUNTER — HOSPITAL ENCOUNTER (OUTPATIENT)
Dept: CT IMAGING | Age: 47
End: 2022-04-02
Attending: PHYSICIAN ASSISTANT

## 2022-04-02 ENCOUNTER — HOSPITAL ENCOUNTER (OUTPATIENT)
Dept: CT IMAGING | Age: 47
Discharge: HOME | End: 2022-04-02
Attending: PHYSICIAN ASSISTANT

## 2022-04-02 DIAGNOSIS — R93.5 ABNORMAL CT OF THE ABDOMEN: ICD-10-CM

## 2022-04-02 DIAGNOSIS — C54.1 ENDOMETRIAL ADENOCARCINOMA (CMD): ICD-10-CM

## 2022-04-02 DIAGNOSIS — R22.2 MASS OF ANTERIOR ABDOMINAL WALL: ICD-10-CM

## 2022-04-02 DIAGNOSIS — Z80.3 FAMILY HISTORY OF BREAST CANCER: ICD-10-CM

## 2022-04-02 PROCEDURE — 10002805 HB CONTRAST AGENT: Performed by: PHYSICIAN ASSISTANT

## 2022-04-02 PROCEDURE — 74177 CT ABD & PELVIS W/CONTRAST: CPT

## 2022-04-02 RX ADMIN — IOHEXOL 100 ML: 350 INJECTION, SOLUTION INTRAVENOUS at 09:16

## 2022-04-04 ENCOUNTER — EXTERNAL RECORD (OUTPATIENT)
Dept: HEALTH INFORMATION MANAGEMENT | Facility: OTHER | Age: 47
End: 2022-04-04

## 2022-09-30 ENCOUNTER — APPOINTMENT (OUTPATIENT)
Dept: CT IMAGING | Age: 47
End: 2022-09-30
Attending: EMERGENCY MEDICINE

## 2022-09-30 ENCOUNTER — HOSPITAL ENCOUNTER (EMERGENCY)
Age: 47
Discharge: HOME | End: 2022-09-30
Attending: EMERGENCY MEDICINE

## 2022-09-30 ENCOUNTER — HOSPITAL ENCOUNTER (OUTPATIENT)
Dept: CT IMAGING | Age: 47
Discharge: HOME | End: 2022-09-30
Attending: OBSTETRICS & GYNECOLOGY

## 2022-09-30 VITALS
RESPIRATION RATE: 18 BRPM | HEART RATE: 82 BPM | DIASTOLIC BLOOD PRESSURE: 80 MMHG | OXYGEN SATURATION: 100 % | HEIGHT: 64 IN | SYSTOLIC BLOOD PRESSURE: 140 MMHG | BODY MASS INDEX: 44.56 KG/M2 | WEIGHT: 261.02 LBS | TEMPERATURE: 98.8 F

## 2022-09-30 DIAGNOSIS — R93.89 ABNORMAL FINDING ON IMAGING: Primary | ICD-10-CM

## 2022-09-30 DIAGNOSIS — C54.1 ENDOMETRIAL ADENOCARCINOMA (CMD): ICD-10-CM

## 2022-09-30 LAB
ALBUMIN SERPL-MCNC: 3.8 G/DL (ref 3.6–5.1)
ALBUMIN/GLOB SERPL: 1 {RATIO} (ref 1–2.4)
ALP SERPL-CCNC: 196 UNITS/L (ref 45–117)
ALT SERPL-CCNC: 44 UNITS/L
ANION GAP SERPL CALC-SCNC: 10 MMOL/L (ref 7–19)
APTT PPP: 27 SEC (ref 22–30)
AST SERPL-CCNC: 34 UNITS/L
BASOPHILS # BLD: 0 K/MCL (ref 0–0.3)
BASOPHILS NFR BLD: 0 %
BILIRUB SERPL-MCNC: 0.5 MG/DL (ref 0.2–1)
BUN SERPL-MCNC: 13 MG/DL (ref 6–20)
BUN/CREAT SERPL: 20 (ref 7–25)
CALCIUM SERPL-MCNC: 9.6 MG/DL (ref 8.4–10.2)
CHLORIDE SERPL-SCNC: 110 MMOL/L (ref 97–110)
CO2 SERPL-SCNC: 25 MMOL/L (ref 21–32)
CREAT SERPL-MCNC: 0.66 MG/DL (ref 0.51–0.95)
DEPRECATED RDW RBC: 44.8 FL (ref 39–50)
EOSINOPHIL # BLD: 0.1 K/MCL (ref 0–0.5)
EOSINOPHIL NFR BLD: 1 %
ERYTHROCYTE [DISTWIDTH] IN BLOOD: 13.3 % (ref 11–15)
FASTING DURATION TIME PATIENT: ABNORMAL H
GFR SERPLBLD BASED ON 1.73 SQ M-ARVRAT: >90 ML/MIN
GLOBULIN SER-MCNC: 4 G/DL (ref 2–4)
GLUCOSE SERPL-MCNC: 102 MG/DL (ref 70–99)
HCT VFR BLD CALC: 43.5 % (ref 36–46.5)
HGB BLD-MCNC: 13.9 G/DL (ref 12–15.5)
IMM GRANULOCYTES # BLD AUTO: 0 K/MCL (ref 0–0.2)
IMM GRANULOCYTES # BLD: 0 %
INR PPP: 1
LYMPHOCYTES # BLD: 0.6 K/MCL (ref 1–4.8)
LYMPHOCYTES NFR BLD: 11 %
MCH RBC QN AUTO: 29.6 PG (ref 26–34)
MCHC RBC AUTO-ENTMCNC: 32 G/DL (ref 32–36.5)
MCV RBC AUTO: 92.8 FL (ref 78–100)
MONOCYTES # BLD: 0.4 K/MCL (ref 0.3–0.9)
MONOCYTES NFR BLD: 7 %
NEUTROPHILS # BLD: 4.6 K/MCL (ref 1.8–7.7)
NEUTROPHILS NFR BLD: 81 %
NRBC BLD MANUAL-RTO: 0 /100 WBC
PLATELET # BLD AUTO: 212 K/MCL (ref 140–450)
POTASSIUM SERPL-SCNC: 3.7 MMOL/L (ref 3.4–5.1)
PROT SERPL-MCNC: 7.8 G/DL (ref 6.4–8.2)
PROTHROMBIN TIME: 10.5 SEC (ref 9.7–11.8)
RAINBOW EXTRA TUBES HOLD SPECIMEN: NORMAL
RBC # BLD: 4.69 MIL/MCL (ref 4–5.2)
SODIUM SERPL-SCNC: 141 MMOL/L (ref 135–145)
WBC # BLD: 5.7 K/MCL (ref 4.2–11)

## 2022-09-30 PROCEDURE — 10002805 HB CONTRAST AGENT: Performed by: EMERGENCY MEDICINE

## 2022-09-30 PROCEDURE — 10002805 HB CONTRAST AGENT: Performed by: OBSTETRICS & GYNECOLOGY

## 2022-09-30 PROCEDURE — 74177 CT ABD & PELVIS W/CONTRAST: CPT

## 2022-09-30 PROCEDURE — 85730 THROMBOPLASTIN TIME PARTIAL: CPT | Performed by: EMERGENCY MEDICINE

## 2022-09-30 PROCEDURE — 99283 EMERGENCY DEPT VISIT LOW MDM: CPT

## 2022-09-30 PROCEDURE — 85025 COMPLETE CBC W/AUTO DIFF WBC: CPT | Performed by: EMERGENCY MEDICINE

## 2022-09-30 PROCEDURE — 36415 COLL VENOUS BLD VENIPUNCTURE: CPT

## 2022-09-30 PROCEDURE — 85610 PROTHROMBIN TIME: CPT | Performed by: EMERGENCY MEDICINE

## 2022-09-30 PROCEDURE — 80053 COMPREHEN METABOLIC PANEL: CPT | Performed by: EMERGENCY MEDICINE

## 2022-09-30 PROCEDURE — 74174 CTA ABD&PLVS W/CONTRAST: CPT

## 2022-09-30 RX ORDER — CHOLECALCIFEROL (VITAMIN D3) 10(400)/ML
DROPS ORAL DAILY
COMMUNITY

## 2022-09-30 RX ORDER — LETROZOLE 2.5 MG/1
2.5 TABLET, FILM COATED ORAL DAILY
COMMUNITY
Start: 2022-08-06

## 2022-09-30 RX ADMIN — IOHEXOL 100 ML: 350 INJECTION, SOLUTION INTRAVENOUS at 09:55

## 2022-09-30 RX ADMIN — IOHEXOL 75 ML: 350 INJECTION, SOLUTION INTRAVENOUS at 14:47

## 2022-09-30 ASSESSMENT — PAIN SCALES - GENERAL
PAINLEVEL_OUTOF10: 0
PAINLEVEL_OUTOF10: 0

## 2022-10-31 ENCOUNTER — EXTERNAL RECORD (OUTPATIENT)
Dept: HEALTH INFORMATION MANAGEMENT | Facility: OTHER | Age: 47
End: 2022-10-31

## 2022-10-31 DIAGNOSIS — E28.319 PREMATURE MENOPAUSE: Primary | ICD-10-CM

## 2022-10-31 DIAGNOSIS — C54.1 ENDOMETRIAL ADENOCARCINOMA (CMD): ICD-10-CM

## 2022-10-31 DIAGNOSIS — Z12.31 ENCOUNTER FOR MAMMOGRAM TO ESTABLISH BASELINE MAMMOGRAM: Primary | ICD-10-CM

## 2022-10-31 DIAGNOSIS — R91.8 PULMONARY MASS: Primary | ICD-10-CM

## 2022-11-22 ENCOUNTER — HOSPITAL ENCOUNTER (OUTPATIENT)
Dept: CT IMAGING | Age: 47
Discharge: HOME | End: 2022-11-22
Attending: OBSTETRICS & GYNECOLOGY

## 2022-11-22 DIAGNOSIS — Z12.31 ENCOUNTER FOR SCREENING MAMMOGRAM FOR MALIGNANT NEOPLASM OF BREAST: ICD-10-CM

## 2022-11-22 PROCEDURE — 77063 BREAST TOMOSYNTHESIS BI: CPT

## 2023-01-25 ENCOUNTER — HOSPITAL ENCOUNTER (OUTPATIENT)
Dept: LAB | Age: 48
Discharge: HOME | End: 2023-01-25
Attending: OBSTETRICS & GYNECOLOGY

## 2023-01-25 DIAGNOSIS — N93.0 POSTCOITAL AND CONTACT BLEEDING: ICD-10-CM

## 2023-01-25 DIAGNOSIS — Z80.49 FAMILY HISTORY OF MALIGNANT NEOPLASM OF OTHER GENITAL ORGANS: ICD-10-CM

## 2023-01-25 DIAGNOSIS — Z80.3 FAMILY HISTORY OF MALIGNANT NEOPLASM OF BREAST: ICD-10-CM

## 2023-01-25 DIAGNOSIS — C54.1 MALIGNANT NEOPLASM OF ENDOMETRIUM (CMD): ICD-10-CM

## 2023-01-25 DIAGNOSIS — R30.0 DYSURIA: ICD-10-CM

## 2023-01-25 DIAGNOSIS — R19.09 OTHER INTRA-ABDOMINAL AND PELVIC SWELLING, MASS AND LUMP: ICD-10-CM

## 2023-01-25 DIAGNOSIS — R93.5 ABNORMAL FINDINGS ON DIAGNOSTIC IMAGING OF OTHER ABDOMINAL REGIONS, INCLUDING RETROPERITONEUM: Primary | ICD-10-CM

## 2023-01-25 LAB
APPEARANCE UR: CLEAR
BACTERIA #/AREA URNS HPF: ABNORMAL /HPF
BILIRUB UR QL STRIP: NEGATIVE
COLOR UR: ABNORMAL
GLUCOSE UR STRIP-MCNC: NEGATIVE MG/DL
HGB UR QL STRIP: NEGATIVE
HYALINE CASTS #/AREA URNS LPF: ABNORMAL /LPF
KETONES UR STRIP-MCNC: NEGATIVE MG/DL
LEUKOCYTE ESTERASE UR QL STRIP: ABNORMAL
MUCOUS THREADS URNS QL MICRO: PRESENT
NITRITE UR QL STRIP: NEGATIVE
PH UR STRIP: 5 [PH] (ref 5–7)
PROT UR STRIP-MCNC: NEGATIVE MG/DL
RBC #/AREA URNS HPF: ABNORMAL /HPF
SP GR UR STRIP: 1.02 (ref 1–1.03)
SQUAMOUS #/AREA URNS HPF: ABNORMAL /HPF
UROBILINOGEN UR STRIP-MCNC: 0.2 MG/DL
WBC #/AREA URNS HPF: ABNORMAL /HPF

## 2023-01-25 PROCEDURE — 81001 URINALYSIS AUTO W/SCOPE: CPT | Performed by: CLINICAL MEDICAL LABORATORY

## 2023-01-25 PROCEDURE — 87086 URINE CULTURE/COLONY COUNT: CPT | Performed by: CLINICAL MEDICAL LABORATORY

## 2023-01-27 LAB — BACTERIA UR CULT: NORMAL

## 2023-04-14 ENCOUNTER — HOSPITAL ENCOUNTER (OUTPATIENT)
Dept: GENERAL RADIOLOGY | Age: 48
Discharge: HOME | End: 2023-04-14
Attending: OBSTETRICS & GYNECOLOGY

## 2023-04-14 DIAGNOSIS — E28.319 PREMATURE MENOPAUSE: ICD-10-CM

## 2023-04-14 PROCEDURE — 77080 DXA BONE DENSITY AXIAL: CPT

## 2023-04-22 ENCOUNTER — APPOINTMENT (OUTPATIENT)
Dept: CT IMAGING | Age: 48
End: 2023-04-22
Attending: OBSTETRICS & GYNECOLOGY

## 2023-04-22 ENCOUNTER — HOSPITAL ENCOUNTER (OUTPATIENT)
Dept: CT IMAGING | Age: 48
Discharge: HOME | End: 2023-04-22
Attending: OBSTETRICS & GYNECOLOGY

## 2023-04-22 DIAGNOSIS — C54.1 ENDOMETRIAL ADENOCARCINOMA (CMD): ICD-10-CM

## 2023-04-22 DIAGNOSIS — R91.8 PULMONARY MASS: ICD-10-CM

## 2023-04-22 PROCEDURE — G1004 CDSM NDSC: HCPCS

## 2023-04-22 PROCEDURE — 71260 CT THORAX DX C+: CPT

## 2023-04-22 PROCEDURE — 74177 CT ABD & PELVIS W/CONTRAST: CPT

## 2023-04-22 PROCEDURE — 10002805 HB CONTRAST AGENT: Performed by: OBSTETRICS & GYNECOLOGY

## 2023-04-22 RX ADMIN — IOHEXOL 80 ML: 350 INJECTION, SOLUTION INTRAVENOUS at 11:13

## 2023-05-01 ENCOUNTER — EXTERNAL RECORD (OUTPATIENT)
Dept: HEALTH INFORMATION MANAGEMENT | Facility: OTHER | Age: 48
End: 2023-05-01

## 2023-08-16 DIAGNOSIS — R19.01 ABDOMINAL WALL MASS OF RIGHT UPPER QUADRANT: ICD-10-CM

## 2023-08-16 DIAGNOSIS — R93.5 ABNORMAL CT OF THE ABDOMEN: Primary | ICD-10-CM

## 2023-11-24 ENCOUNTER — HOSPITAL ENCOUNTER (OUTPATIENT)
Dept: CT IMAGING | Age: 48
Discharge: HOME | End: 2023-11-24
Attending: OBSTETRICS & GYNECOLOGY

## 2023-11-24 DIAGNOSIS — Z12.31 ENCOUNTER FOR MAMMOGRAM TO ESTABLISH BASELINE MAMMOGRAM: ICD-10-CM

## 2023-11-24 PROCEDURE — 77063 BREAST TOMOSYNTHESIS BI: CPT

## 2023-11-25 ENCOUNTER — APPOINTMENT (OUTPATIENT)
Dept: MAMMOGRAPHY | Age: 48
End: 2023-11-25

## 2023-12-06 ENCOUNTER — APPOINTMENT (OUTPATIENT)
Dept: CT IMAGING | Age: 48
End: 2023-12-06
Attending: OBSTETRICS & GYNECOLOGY

## 2023-12-19 ENCOUNTER — HOSPITAL ENCOUNTER (OUTPATIENT)
Dept: CT IMAGING | Age: 48
Discharge: HOME | End: 2023-12-19
Attending: OBSTETRICS & GYNECOLOGY

## 2023-12-19 DIAGNOSIS — R93.5 ABNORMAL CT OF THE ABDOMEN: ICD-10-CM

## 2023-12-19 DIAGNOSIS — R19.01 ABDOMINAL WALL MASS OF RIGHT UPPER QUADRANT: ICD-10-CM

## 2023-12-19 PROCEDURE — 71260 CT THORAX DX C+: CPT

## 2023-12-19 PROCEDURE — 74177 CT ABD & PELVIS W/CONTRAST: CPT

## 2023-12-19 PROCEDURE — 10002805 HB CONTRAST AGENT: Performed by: OBSTETRICS & GYNECOLOGY

## 2023-12-19 RX ADMIN — IOHEXOL 75 ML: 350 INJECTION, SOLUTION INTRAVENOUS at 13:32

## 2024-01-08 ENCOUNTER — EXTERNAL RECORD (OUTPATIENT)
Dept: HEALTH INFORMATION MANAGEMENT | Facility: OTHER | Age: 49
End: 2024-01-08

## 2024-01-08 DIAGNOSIS — R93.5 ABNORMAL FINDINGS ON DIAGNOSTIC IMAGING OF ABDOMEN: ICD-10-CM

## 2024-01-08 DIAGNOSIS — R91.8 PULMONARY MASS: ICD-10-CM

## 2024-01-08 DIAGNOSIS — R22.2 MASS OF ANTERIOR ABDOMINAL WALL: ICD-10-CM

## 2024-01-08 DIAGNOSIS — C54.1 ENDOMETRIAL ADENOCARCINOMA (CMD): Primary | ICD-10-CM

## 2024-02-22 ENCOUNTER — HOSPITAL ENCOUNTER (OUTPATIENT)
Age: 49
Discharge: HOME | End: 2024-02-22
Attending: EMERGENCY MEDICINE
Payer: COMMERCIAL

## 2024-02-22 VITALS
SYSTOLIC BLOOD PRESSURE: 146 MMHG | RESPIRATION RATE: 20 BRPM | DIASTOLIC BLOOD PRESSURE: 92 MMHG | HEART RATE: 88 BPM | OXYGEN SATURATION: 100 % | TEMPERATURE: 97 F

## 2024-02-22 DIAGNOSIS — H65.91 RIGHT OTITIS MEDIA WITH EFFUSION: Primary | ICD-10-CM

## 2024-02-22 PROCEDURE — 99213 OFFICE O/P EST LOW 20 MIN: CPT

## 2024-02-22 RX ORDER — AMOXICILLIN AND CLAVULANATE POTASSIUM 875; 125 MG/1; MG/1
1 TABLET, FILM COATED ORAL 2 TIMES DAILY
Qty: 14 TABLET | Refills: 0 | Status: SHIPPED | OUTPATIENT
Start: 2024-02-22 | End: 2024-02-29

## 2024-02-23 NOTE — ED PROVIDER NOTES
Patient Seen in: [facility]      History     Chief Complaint   Patient presents with    Ear Problem Pain     Stated Complaint: ear pain    Subjective:   HPI    Patient is a 49-year-old female with no significant past medical history who presents now with right ear pain.  Patient states she has had the sensation of fullness in the right ear for the last few days.  Patient states the ear became painful today.  The patient has been taking Mucinex at home.  Patient denies any fever or trauma to the ear    Objective:   No pertinent past medical history.            No pertinent past surgical history.              No pertinent social history.            Review of Systems    Positive for stated complaint: ear pain  Other systems are as noted in HPI.  Constitutional and vital signs reviewed.      All other systems reviewed and negative except as noted above.    Physical Exam     ED Triage Vitals [02/22/24 1818]   BP (!) 146/92   Pulse 88   Resp 20   Temp 97.1 °F (36.2 °C)   Temp src Temporal   SpO2 100 %   O2 Device None (Room air)       Current:BP (!) 146/92   Pulse 88   Temp 97.1 °F (36.2 °C) (Temporal)   Resp 20   SpO2 100%         Physical Exam    Constitutional: Well-developed well-nourished in no acute distress  Head: Normocephalic, no swelling or tenderness  Eyes: Nonicteric sclera, no conjunctival injection  ENT: TM is clear and flat.  The right TM is moderately erythematous and bulging with loss there is no posterior pharyngeal erythema  Chest: Clear to auscultation, no tenderness  Cardiovascular: Regular rate and rhythm without murmur  Abdomen: Soft, nontender and nondistended  Neurologic: Patient is awake, alert and oriented ×3.  The patient's motor strength is 5 out of 5 and symmetric in the upper and lower extremities bilaterally  Extremities: No focal swelling or tenderness  Skin: No pallor, no redness or warmth to the touch      ED Course   Labs Reviewed - No data to display          Will  initiate p.o. Augmentin for right otitis media.  Recommend continuing Mucinex.         MDM      Otitis media versus otitis externa                                   Medical Decision Making      Disposition and Plan     Clinical Impression:  1. Right otitis media with effusion         Disposition:  Discharge  2/22/2024  6:26 pm    Follow-up:  Sara Bob  [address] [address] - [address] [address]  [phone]      As needed          Medications Prescribed:  Discharge Medication List as of 2/22/2024  6:27 PM        START taking these medications    Details   amoxicillin clavulanate 875-125 MG Oral Tab Take 1 tablet by mouth 2 (two) times daily for 7 days., Print, Disp-14 tablet, R-0

## 2024-03-04 ENCOUNTER — HOSPITAL ENCOUNTER (OUTPATIENT)
Age: 49
Discharge: HOME | End: 2024-03-04
Attending: EMERGENCY MEDICINE
Payer: COMMERCIAL

## 2024-03-04 VITALS
DIASTOLIC BLOOD PRESSURE: 81 MMHG | RESPIRATION RATE: 16 BRPM | TEMPERATURE: 98 F | HEART RATE: 67 BPM | SYSTOLIC BLOOD PRESSURE: 133 MMHG | OXYGEN SATURATION: 100 %

## 2024-03-04 DIAGNOSIS — H69.91 DYSFUNCTION OF RIGHT EUSTACHIAN TUBE: ICD-10-CM

## 2024-03-04 DIAGNOSIS — H65.91 RIGHT OTITIS MEDIA WITH EFFUSION: Primary | ICD-10-CM

## 2024-03-04 PROCEDURE — 99213 OFFICE O/P EST LOW 20 MIN: CPT

## 2024-03-04 RX ORDER — PREDNISONE 20 MG/1
40 TABLET ORAL DAILY
Qty: 10 TABLET | Refills: 0 | Status: SHIPPED | OUTPATIENT
Start: 2024-03-04 | End: 2024-03-09

## 2024-03-04 RX ORDER — AMOXICILLIN AND CLAVULANATE POTASSIUM 875; 125 MG/1; MG/1
1 TABLET, FILM COATED ORAL 2 TIMES DAILY
Qty: 20 TABLET | Refills: 0 | Status: SHIPPED | OUTPATIENT
Start: 2024-03-04 | End: 2024-03-14

## 2024-03-05 NOTE — ED PROVIDER NOTES
Patient Seen in: [facility]      History     Chief Complaint   Patient presents with    Ear Problem Pain     Stated Complaint: Ear problem    Subjective:   HPI    The patient is a 49-year-old female with no significant past medical history presents now with persistent right ear discomfort, sensation of being \"clogged\".  Patient was here on 2/22/2024 with similar symptoms.  Patient was prescribed amoxicillin.  The patient states his symptoms persisted.  Patient continues to take Mucinex.  Patient describes a pressure sensation to the ear and feels like her hearing is diminished.    Objective:   Past Medical History:   Diagnosis Date    Cancer (HCC)     endometrial cancer              Past Surgical History:   Procedure Laterality Date    HYSTERECTOMY  2016                Social History     Socioeconomic History    Marital status: Single   Tobacco Use    Smoking status: Never    Smokeless tobacco: Never   Vaping Use    Vaping Use: Never used   Substance and Sexual Activity    Alcohol use: Never    Drug use: Never              Review of Systems    Positive for stated complaint: Ear problem  Other systems are as noted in HPI.  Constitutional and vital signs reviewed.      All other systems reviewed and negative except as noted above.    Physical Exam     ED Triage Vitals [03/04/24 1805]   /81   Pulse 67   Resp 16   Temp 97.5 °F (36.4 °C)   Temp src Temporal   SpO2 100 %   O2 Device None (Room air)       Current:/81   Pulse 67   Temp 97.5 °F (36.4 °C) (Temporal)   Resp 16   SpO2 100%         Physical Exam    Constitutional: Well-developed well-nourished in no acute distress  Head: Normocephalic, no swelling or tenderness  Eyes: Nonicteric sclera, no conjunctival injection  ENT: The left TM is clear and flat.  The external auditory canals are clear bilaterally.  The right TM is minimally erythematous with some mild fluid.  There is no posterior pharyngeal erythema  Chest: Clear to  auscultation, no tenderness  Cardiovascular: Regular rate and rhythm without murmur  Abdomen: Soft, nontender and nondistended  Neurologic: Patient is awake, alert and oriented ×3.  The patient's motor strength is 5 out of 5 and symmetric in the upper and lower extremities bilaterally  Extremities: No focal swelling or tenderness  Skin: No pallor, no redness or warmth to the touch      ED Course   Labs Reviewed - No data to display          Will initiate Augmentin as well as prednisone.  Will provide with ENT follow-up.         MDM      Otitis media versus otitis externa                                   Medical Decision Making      Disposition and Plan     Clinical Impression:  1. Right otitis media with effusion    2. Dysfunction of right eustachian tube         Disposition:  Discharge  3/4/2024  6:14 pm    Follow-up:  Kenzie Howell MD  [address] [place] [address] [id number] [phone]                Medications Prescribed:  Current Discharge Medication List        START taking these medications    Details   predniSONE 20 MG Oral Tab Take 2 tablets (40 mg total) by mouth daily for 5 days.  Qty: 10 tablet, Refills: 0

## 2024-03-05 NOTE — ED INITIAL ASSESSMENT (HPI)
Demetria arrives ambulatory with c/o not being able to hear out of right ear. Reports she was here last month and was prescribed amox. Reports her ear still feels the same.

## 2024-03-26 ENCOUNTER — OFFICE VISIT (OUTPATIENT)
Dept: OTOLARYNGOLOGY | Facility: CLINIC | Age: 49
End: 2024-03-26

## 2024-03-26 DIAGNOSIS — H93.8X1 SENSATION OF FULLNESS IN RIGHT EAR: Primary | ICD-10-CM

## 2024-03-26 PROCEDURE — 92504 EAR MICROSCOPY EXAMINATION: CPT | Performed by: STUDENT IN AN ORGANIZED HEALTH CARE EDUCATION/TRAINING PROGRAM

## 2024-03-26 PROCEDURE — 99203 OFFICE O/P NEW LOW 30 MIN: CPT | Performed by: STUDENT IN AN ORGANIZED HEALTH CARE EDUCATION/TRAINING PROGRAM

## 2024-03-26 PROCEDURE — 92567 TYMPANOMETRY: CPT | Performed by: STUDENT IN AN ORGANIZED HEALTH CARE EDUCATION/TRAINING PROGRAM

## 2024-03-26 NOTE — PROGRESS NOTES
Edda Gerard is a 49 year old female.   Chief Complaint   Patient presents with    Ear Problem     C/o right ear congestion X 1 month   No drainage, reports pressure        ASSESSMENT AND PLAN:   1. Sensation of fullness in right ear  49-year-old presents with ear fullness on the right for about 1 month.  She was given some amoxicillin and oral steroids by an urgent care.  Reports possible stress with her job.  Denies clenching her teeth.  Reports 1 episode of vertigo    Ear exam normal.  Type a tympanograms in each side.  Minimal tenderness of the temporomandibular joints    Reassured her of the normal ear exam and tympanograms.  Discussed the most common etiologies of her symptoms including a temporomandibular joint disorder.  She is going to try ibuprofen 600 mg prior to sleep.  If not improving in the next several weeks she should return for an audiogram.  Low on the differential is Ménière's disease given her ear fullness and 1 episode of vertigo. Consult from Dr Fowler regarding ear evaluation    - Audiology Exam      The patient indicates understanding of these issues and agrees to the plan.      EXAM:   There were no vitals taken for this visit.    Pertinent exam findings may also be noted above in assessment and plan     System Details   Skin Inspection - Normal.   Constitutional Overall appearance - Normal.   Head/Face Symmetric, TMJ tenderness not present    Eyes EOMI, PERRL   Right ear:  Canal clear, TM intact, no AKILAH   Left ear:  Canal clear, TM intact, no AKILAH   Nose: Septum midline, inferior turbinates not enlarged, nasal valves without collapse    Oral cavity/Oropharynx: No lesions or masses on inspection or palpation, tonsils symmetric    Neck: Soft without LAD, thyroid not enlarged  Voice clear/ no stridor   Other:      Scopes and Procedures:             Current Outpatient Medications   Medication Sig Dispense Refill    letrozole 2.5 MG Oral Tab Take 1 tablet (2.5 mg total) by mouth once daily.   4      Past Medical History:   Diagnosis Date    Cancer (HCC)     endometrial cancer      Social History:  Social History     Socioeconomic History    Marital status: Single   Tobacco Use    Smoking status: Never    Smokeless tobacco: Never   Vaping Use    Vaping Use: Never used   Substance and Sexual Activity    Alcohol use: Never    Drug use: Never          Lauro Sequeira MD  3/26/2024  7:44 AM

## 2024-04-09 ENCOUNTER — HOSPITAL ENCOUNTER (OUTPATIENT)
Age: 49
Discharge: HOME | End: 2024-04-09
Payer: COMMERCIAL

## 2024-04-09 VITALS
DIASTOLIC BLOOD PRESSURE: 98 MMHG | RESPIRATION RATE: 18 BRPM | TEMPERATURE: 97 F | HEART RATE: 75 BPM | SYSTOLIC BLOOD PRESSURE: 145 MMHG | OXYGEN SATURATION: 97 %

## 2024-04-09 DIAGNOSIS — S61.012A LACERATION OF LEFT THUMB WITHOUT FOREIGN BODY WITHOUT DAMAGE TO NAIL, INITIAL ENCOUNTER: Primary | ICD-10-CM

## 2024-04-09 PROCEDURE — 12001 RPR S/N/AX/GEN/TRNK 2.5CM/<: CPT

## 2024-04-09 PROCEDURE — 99213 OFFICE O/P EST LOW 20 MIN: CPT

## 2024-04-09 NOTE — ED PROVIDER NOTES
Chief Complaint   Patient presents with    Laceration/Abrasion       HPI:     Edda Gerard is a 49 year old female who presents laceration along the left thumb prior to arrival.  States instantly cut self with a clean knife while cutting up salad.  Bleeding controlled with pressure, denies anticoagulant therapy, tetanus updated within the last 10 years.  Denies numbness or tingling, pain is a 3 out of 10      PFSH    PFSH asessment screens reviewed and agree.  Nurses notes reviewed I agree with documentation.    Family History   Problem Relation Age of Onset    Cancer Father         bladder cancer    Other (Other) Father         macular degeneration    Cancer Mother         breast cancer and endometrial cancer    Diabetes Mother     Hypertension Mother      Family history reviewed with patient/caregiver and is not pertinent to presenting problem.  Social History     Socioeconomic History    Marital status: Single     Spouse name: Not on file    Number of children: Not on file    Years of education: Not on file    Highest education level: Not on file   Occupational History    Not on file   Tobacco Use    Smoking status: Never    Smokeless tobacco: Never   Vaping Use    Vaping Use: Never used   Substance and Sexual Activity    Alcohol use: Never    Drug use: Never    Sexual activity: Not on file   Other Topics Concern    Not on file   Social History Narrative    Not on file     Social Determinants of Health     Financial Resource Strain: Not on file   Food Insecurity: Not on file   Transportation Needs: Not on file   Physical Activity: Not on file   Stress: Not on file   Social Connections: Not on file   Housing Stability: Not on file         ROS:   Positive for stated complaint: Thumb pain.  All other systems reviewed and negative except as noted above.  Constitutional and Vital Signs Reviewed.      Physical Exam:     Findings:    BP (!) 145/98   Pulse 75   Temp 97 °F (36.1 °C) (Temporal)   Resp 18   SpO2 97%    GENERAL: well developed, well nourished, well hydrated, no distress  SKIN: good skin turgor, no obvious rashes  EXTREMITIES: 0.75 cm laceration along the left distal thumb medially to the nailbed, nailbed intact.  No visualized tendon or bone injury superficial.  NOBLE without difficulty  HEAD: normocephalic, atraumatic  EYES: sclera non icteric bilateral, conjunctiva clear  NEURO: No focal deficits  PSYCH: Alert and oriented x3.  Answering questions appropriately.  Mood appropriate.    MDM/Assessment/Plan:   Orders for this encounter:    No orders of the defined types were placed in this encounter.      Labs performed this visit:  No results found for this or any previous visit (from the past 10 hour(s)).    MDM:  Patient tolerated sutures well without complication instructed on outpatient follow-up and suture removal in 1 week.  Happy with plan of care agrees with over-the-counter's as needed.    Sterile field, copious irrigation.#1 5-0 nylon simple interrupted placed, no complications tolerated well time: 5 minutes.    Diagnosis:    ICD-10-CM    1. Laceration of left thumb without foreign body without damage to nail, initial encounter  S61.012A           All results reviewed and discussed with patient.  See AVS for detailed discharge instructions for your condition today.    Follow Up with:  Sara Bob  [address] [address] - [address] [address]  [phone]    Schedule an appointment as soon as possible for a visit in 1 week  SUTURE REMOVAL 1 WEEK

## 2024-04-19 ENCOUNTER — HOSPITAL ENCOUNTER (OUTPATIENT)
Age: 49
Discharge: HOME | End: 2024-04-19
Payer: COMMERCIAL

## 2024-04-19 VITALS
DIASTOLIC BLOOD PRESSURE: 80 MMHG | OXYGEN SATURATION: 100 % | SYSTOLIC BLOOD PRESSURE: 127 MMHG | TEMPERATURE: 98 F | RESPIRATION RATE: 18 BRPM | HEART RATE: 72 BPM

## 2024-04-19 DIAGNOSIS — Z48.02 ENCOUNTER FOR REMOVAL OF SUTURES: Primary | ICD-10-CM

## 2024-04-19 NOTE — ED INITIAL ASSESSMENT (HPI)
Suture to left thumb placed here 4/9. Sutures intact. No signs of infection. Here for suture removal.

## 2024-04-19 NOTE — ED PROVIDER NOTES
Patient Seen in: [facility]      History     Chief Complaint   Patient presents with    Sut Stap RingRemoval     Stated Complaint: stitches removal    Subjective:   HPI    48 yo female here for suture removal. Sutures placed 10 days ago to the  L thumb. Offers no complaints.     Objective:   Past Medical History:    Cancer (HCC)    endometrial cancer              Past Surgical History:   Procedure Laterality Date    Hysterectomy  2016                Social History     Socioeconomic History    Marital status: Single   Tobacco Use    Smoking status: Never    Smokeless tobacco: Never   Vaping Use    Vaping status: Never Used   Substance and Sexual Activity    Alcohol use: Never    Drug use: Never              Review of Systems    Positive for stated complaint: stitches removal  Other systems are as noted in HPI.  Constitutional and vital signs reviewed.      All other systems reviewed and negative except as noted above.    Physical Exam     ED Triage Vitals   BP    Pulse    Resp    Temp    Temp src    SpO2    O2 Device        Current:There were no vitals taken for this visit.        Physical Exam  Vitals and nursing note reviewed.   Constitutional:       General: She is not in acute distress.  HENT:      Head: Normocephalic and atraumatic.      Right Ear: External ear normal.      Left Ear: External ear normal.      Nose: Nose normal.      Mouth/Throat:      Mouth: Mucous membranes are moist.   Eyes:      Extraocular Movements: Extraocular movements intact.      Pupils: Pupils are equal, round, and reactive to light.   Cardiovascular:      Rate and Rhythm: Normal rate.   Pulmonary:      Effort: Pulmonary effort is normal.   Abdominal:      General: Abdomen is flat.   Musculoskeletal:         General: Normal range of motion.      Cervical back: Normal range of motion.      Comments: 1 suture c/d/I to lateral aspect of L thumb   Skin:     General: Skin is warm.   Neurological:      General: No focal deficit  present.      Mental Status: She is alert and oriented to person, place, and time.   Psychiatric:         Mood and Affect: Mood normal.         Behavior: Behavior normal.               ED Course   Labs Reviewed - No data to display      50 yo female here for suture removal. Offers no complaints  Ddx- suture removal, wound infection, cellulitis  1 suture removed. Wound c/d/I. Discussed scar minimization.               MDM                                         Medical Decision Making      Disposition and Plan     Clinical Impression:  1. Encounter for removal of sutures         Disposition:  Discharge  4/19/2024  2:11 pm    Follow-up:  Sara Bob  [address] [address] - [address] [address]  [phone]                Medications Prescribed:  Discharge Medication List as of 4/19/2024  2:00 PM

## 2024-04-22 ENCOUNTER — E-ADVICE (OUTPATIENT)
Dept: ADMINISTRATIVE | Age: 49
End: 2024-04-22

## 2024-07-01 ENCOUNTER — HOSPITAL ENCOUNTER (OUTPATIENT)
Dept: CT IMAGING | Age: 49
Discharge: HOME | End: 2024-07-01
Attending: OBSTETRICS & GYNECOLOGY

## 2024-07-01 DIAGNOSIS — C54.1 ENDOMETRIAL ADENOCARCINOMA  (CMD): ICD-10-CM

## 2024-07-01 DIAGNOSIS — R91.8 PULMONARY MASS: ICD-10-CM

## 2024-07-01 DIAGNOSIS — R93.5 ABNORMAL FINDINGS ON DIAGNOSTIC IMAGING OF ABDOMEN: ICD-10-CM

## 2024-07-01 DIAGNOSIS — R22.2 MASS OF ANTERIOR ABDOMINAL WALL: ICD-10-CM

## 2024-07-01 PROCEDURE — 10002805 HB CONTRAST AGENT: Performed by: OBSTETRICS & GYNECOLOGY

## 2024-07-01 PROCEDURE — 71260 CT THORAX DX C+: CPT

## 2024-07-01 PROCEDURE — 74177 CT ABD & PELVIS W/CONTRAST: CPT

## 2024-07-01 RX ADMIN — IOHEXOL 95 ML: 350 INJECTION, SOLUTION INTRAVENOUS at 15:48

## 2024-07-15 ENCOUNTER — EXTERNAL RECORD (OUTPATIENT)
Dept: HEALTH INFORMATION MANAGEMENT | Facility: OTHER | Age: 49
End: 2024-07-15

## 2024-07-15 DIAGNOSIS — R93.5 ABNORMAL ABDOMINAL ULTRASOUND: Primary | ICD-10-CM

## 2024-07-15 DIAGNOSIS — R22.2 MASS OF ANTERIOR ABDOMINAL WALL: ICD-10-CM

## 2024-07-15 DIAGNOSIS — R91.8 PULMONARY MASS: ICD-10-CM

## 2024-07-15 DIAGNOSIS — C54.1 ENDOMETRIAL ADENOCARCINOMA  (CMD): ICD-10-CM

## 2024-10-15 NOTE — ED INITIAL ASSESSMENT (HPI)
[facility] Ambulatory Established Patient Note    Chief Complaint   Patient presents with    Elevated Psa    Office Visit         Subjective:  Eliazar Marie is a 69 year old male.  Per chart review of old records, lab values/study results, and patient-given history:    1)  Elevated PSA  - Patient had a PSA of 5.71 on 52/24, mildly elevated for his age.  This was not in the context of UTI or  instrumentation but UA did show large leukocytes.  He's had fluctuations of the PSA in the past, as high as 4.7 in 2015.   No family history of prostate cancer (CaP).  PSA back downtrending to 4.57 in 10/2024.      PSA:    PSA, Total (ng/mL)   Date Value   08/26/2019 3.65   02/19/2019 3.22   07/31/2018 3.34   01/30/2018 4.19 (H)   04/27/2017 3.53   02/01/2016 4.02 (H)   06/11/2015 3.77   05/04/2015 4.73 (H)   01/27/2014 3.22   10/11/2012 3.04     Prostate Specific Antigen (ng/mL)   Date Value   10/04/2024 4.57   07/12/2024 5.23 (H)   05/02/2024 5.71 (H)   04/21/2023 3.82   04/15/2022 4.19   04/14/2021 3.08   03/02/2020 3.64           Past Medical History:   Diagnosis Date    Closed nondisplaced fracture of greater tuberosity of left humerus 03/14/2017    Closed nondisplaced fracture of greater tuberosity of left humerus with routine healing 03/22/2017    Colon polyps 02/23/2023    Diverticulosis 09/08/2012    10 year recall/     Elevated PSA 05/12/2015    Elevated PSA     Hypertension 04/25/2023    Serrated adenoma of colon 03/23/2023    Special screening for malignant neoplasms, colon 09/08/2012    [facility]ley-Normal-recall in 10 years       Past Surgical History:   Procedure Laterality Date    Colonoscopy diagnostic  09/08/2012    10 year recall/ Diverticulosis/ Dr. Hurley    Colonoscopy w biopsy N/A 03/23/2023    [facility]ley/2 polyps/ 1 large SSA / tics/ recall 1 year.       ALLERGIES:  No Known Allergies    Family History   Problem Relation Age of Onset    Glaucoma Mother     Osteoarthritis Mother     Macular  Pt here with laceration to left thumb. Bleeding controlled.    degeneration Mother     Cancer Paternal Grandfather         colon    Cancer Maternal Grandfather     Migraine Father     Stroke Maternal Grandmother     Allergic Rhinitis Sister         penicillin       Social History     Tobacco Use    Smoking status: Never    Smokeless tobacco: Never   Substance Use Topics    Alcohol use: Yes     Alcohol/week: 5.0 standard drinks of alcohol     Types: 6 drink(s) per week    Drug use: No       Current Outpatient Medications   Medication Sig Dispense Refill    ramipril (ALTACE) 10 MG capsule Take 1 capsule by mouth once daily 90 capsule 0    chlorthalidone (THALITONE) 25 MG tablet Take 1/2 (one-half) tablet by mouth once daily 90 tablet 0    Choline Fenofibrate (Fenofibric Acid) 135 MG CAPSULE DELAYED RELEASE Take 1 capsule by mouth once daily 90 capsule 1    Vitamin D, Ergocalciferol, 50 mcg (2,000 units) capsule Take 1 capsule by mouth daily. 90 capsule 3    Multiple Vitamin (MULTIVITAMIN ADULT PO)       aspirin 81 MG tablet Take 81 mg by mouth daily.      fish oil-omega-3 fatty acids 1000 MG CAPS Take 2,000 mg by mouth daily.       No current facility-administered medications for this visit.         Physical Exam:    Constitutional:  The patient is well nourished, in no acute distress, appears stated age.   Psychiatric:  Normal mood/affect.      Results Reviewed:    Recent Labs   Lab 05/23/24  1404   WBC 7.3   HGB 13.5   HCT 41.0     Recent Labs   Lab 05/23/24  1404 05/02/24  0842 11/20/23  1356 11/03/23  0838   Sodium 137 137 138 138   Potassium 3.9 4.3 4.2 4.4   Chloride 104 105 104 104   Carbon Dioxide 26 28 29 29   BUN 35* 33* 32* 32*   Creatinine 1.68* 1.41* 1.31* 1.32*   Glucose 121* 103* 154* 105*   Albumin  --  4.1  --  3.9   Phosphorus 2.9  --  3.5  --      No results for input(s): \"PTT\", \"PT\", \"INR\" in the last 8765 hours.      U/A:    Component      Latest Ref Rng 7/12/2024  10:29 AM   COLOR Straw    CLARITY Cloudy    GLUCOSE(URINE)      Negative mg/dL Negative     BILIRUBIN      Negative  Negative    KETONES      Negative mg/dL Negative    SPECIFIC GRAVITY      1.005 - 1.030  1.014    BLOOD      Negative  Negative    pH      5.0 - 7.0  6.5    PROTEIN(URINE)      Negative mg/dL Negative    UROBILINOGEN      0.2, 1.0 mg/dL 0.2    NITRITE      Negative  Negative    LEUKOCYTE ESTERASE      Negative  Large !    Squamous EPI'S      None Seen, 1 to 5 /hpf None Seen    ERYTHROCYTES, URINALYSIS      None Seen, 1 to 2 /hpf 1 to 2    LEUKOCYTES, URINALYSIS      None Seen, 1 to 5 /hpf 26 to 100 !    BACTERIA, URINALYSIS      None Seen /hpf Large !    HYALINE CASTS, URINALYSIS      None Seen, 1 to 5 /lpf None Seen    MUCOUS Present    Urine, Bacterial Culture No Growth       Legend:  ! Abnormal      PSA:    PSA, Total (ng/mL)   Date Value   08/26/2019 3.65   02/19/2019 3.22   07/31/2018 3.34   01/30/2018 4.19 (H)   04/27/2017 3.53   02/01/2016 4.02 (H)   06/11/2015 3.77   05/04/2015 4.73 (H)   01/27/2014 3.22   10/11/2012 3.04     Prostate Specific Antigen (ng/mL)   Date Value   10/04/2024 4.57   07/12/2024 5.23 (H)   05/02/2024 5.71 (H)   04/21/2023 3.82   04/15/2022 4.19   04/14/2021 3.08   03/02/2020 3.64     Component      Latest Ref Rng 10/4/2024  2:06 PM   PSA Free      ng/mL 1.32    PSA Percent Free      % 28.88                  _________________________________________________________________________      I personally reviewed the following results during this encounter:  CBC  BMP  PSA  Free PSA  UA  UCx      Assessment/Plan:  Eliazar Marie is a 69 year old male with the following urologic issues:    1)  Elevated PSA  Existing Problem, Stable; my clinic and I will plan to be the continuing focal point for all health care services required to provide ongoing care related to this urologic condition; Plan as Follows:  - His PSA has fluctuated since 2015 but always seems to normalize, as it has in late 2024.    - recheck in 6 months along with UA.  PSA elevation possibly due to  inflammation in urine (see below)        2)  Abnormal UA  Existing Problem, Stable; my clinic and I will plan to be the continuing focal point for all health care services required to provide ongoing care related to this urologic condition; Plan as Follows:  - 7/2024 UA did show large leukocytes but no UTI.    - recheck in 6 months at time of PSA.  We'll call him with results.          - Instructions provided as documented in the After Visit Summary.  - The patient indicated understanding of the diagnosis and agreed with the plan of care.         Thank you for allowing me to participate in the care of this patient.  Elaine Craig MD

## 2024-11-18 ENCOUNTER — OFFICE VISIT (OUTPATIENT)
Dept: AUDIOLOGY | Facility: CLINIC | Age: 49
End: 2024-11-18

## 2024-11-18 ENCOUNTER — OFFICE VISIT (OUTPATIENT)
Dept: OTOLARYNGOLOGY | Facility: CLINIC | Age: 49
End: 2024-11-18
Payer: COMMERCIAL

## 2024-11-18 DIAGNOSIS — H91.8X3 ASYMMETRICAL HEARING LOSS: ICD-10-CM

## 2024-11-18 DIAGNOSIS — H93.291 ABNORMAL AUDITORY PERCEPTION OF RIGHT EAR: ICD-10-CM

## 2024-11-18 DIAGNOSIS — H90.3 ASYMMETRIC SNHL (SENSORINEURAL HEARING LOSS): Primary | ICD-10-CM

## 2024-11-18 DIAGNOSIS — H93.8X1 SENSATION OF FULLNESS IN RIGHT EAR: Primary | ICD-10-CM

## 2024-11-18 DIAGNOSIS — H93.8X1 PRESSURE SENSATION IN RIGHT EAR: ICD-10-CM

## 2024-11-18 PROCEDURE — 99214 OFFICE O/P EST MOD 30 MIN: CPT | Performed by: STUDENT IN AN ORGANIZED HEALTH CARE EDUCATION/TRAINING PROGRAM

## 2024-11-18 PROCEDURE — 92557 COMPREHENSIVE HEARING TEST: CPT | Performed by: AUDIOLOGIST

## 2024-11-18 PROCEDURE — 92567 TYMPANOMETRY: CPT | Performed by: AUDIOLOGIST

## 2024-11-18 PROCEDURE — 92504 EAR MICROSCOPY EXAMINATION: CPT | Performed by: STUDENT IN AN ORGANIZED HEALTH CARE EDUCATION/TRAINING PROGRAM

## 2024-11-18 NOTE — PROGRESS NOTES
Edda Gerard is a 49 year old female.   Chief Complaint   Patient presents with    Ear Problem     Right ear fullness and some imbalance     HPI:   49-year-old with persistent clogged feeling in her right ear.  I saw her for similar issue in March.  He says she woke up with some discomfort in this ear and a feeling of some muffled hearing    Current Outpatient Medications   Medication Sig Dispense Refill    letrozole 2.5 MG Oral Tab Take 1 tablet (2.5 mg total) by mouth once daily.  4      Past Medical History:    Cancer (HCC)    endometrial cancer      Social History:  Social History     Socioeconomic History    Marital status: Single   Tobacco Use    Smoking status: Never    Smokeless tobacco: Never   Vaping Use    Vaping status: Never Used   Substance and Sexual Activity    Alcohol use: Never    Drug use: Never      Past Surgical History:   Procedure Laterality Date    Hysterectomy  2016         EXAM:   There were no vitals taken for this visit.    System Details   Skin Inspection - Normal.   Constitutional Overall appearance - Normal.   Head/Face Symmetric, TMJ tenderness not present    Eyes EOMI, PERRL   Right ear:  Canal clear, TM intact, no AKILAH   Left ear:  Canal clear, TM intact, no AKILAH   Nose: Septum midline, inferior turbinates not enlarged, nasal valves without collapse    Oral cavity/Oropharynx: No lesions or masses on inspection or palpation, tonsils symmetric    Neck: Soft without LAD, thyroid not enlarged  Voice clear/ no stridor   Other:      SCOPES AND PROCEDURES:         AUDIOGRAM AND IMAGING:         IMPRESSION:   1. Sensation of fullness in right ear    2. Asymmetrical hearing loss  - MRI IACS (W+WO) (CPT=70553); Future       Recommendations:  -Audiogram with asymmetric sensorineural hearing loss worse in the right ear.  The hearing loss in the right ear is from a mild to moderate degree worse in the low frequencies  -Discussed the differential regarding her asymmetric sensorineural hearing  loss and ear fullness including a previous labyrinthitis, acoustic neuroma or Ménière's disease or an inner ear pathology  -Will obtain an MRI of her internal auditory canals  -She does not have classic symptomatology for Ménière's disease but this could be considered on the differential    The patient indicates understanding of these issues and agrees to the plan.      Lauro Sequeira MD  11/18/2024  4:59 PM

## 2024-11-25 ENCOUNTER — APPOINTMENT (OUTPATIENT)
Dept: CT IMAGING | Age: 49
End: 2024-11-25

## 2024-11-25 DIAGNOSIS — Z12.31 ENCOUNTER FOR SCREENING MAMMOGRAM FOR HIGH-RISK PATIENT: ICD-10-CM

## 2024-11-25 PROCEDURE — 77067 SCR MAMMO BI INCL CAD: CPT

## 2024-12-02 ENCOUNTER — HOSPITAL ENCOUNTER (OUTPATIENT)
Dept: CT IMAGING | Age: 49
Discharge: HOME | End: 2024-12-02
Attending: OBSTETRICS & GYNECOLOGY

## 2024-12-02 DIAGNOSIS — N95.0 POSTMENOPAUSAL BLEEDING: ICD-10-CM

## 2024-12-02 DIAGNOSIS — C54.1 ENDOMETRIAL ADENOCARCINOMA  (CMD): ICD-10-CM

## 2024-12-02 DIAGNOSIS — R22.2 MASS OF ANTERIOR ABDOMINAL WALL: ICD-10-CM

## 2024-12-02 DIAGNOSIS — Z80.3 FAMILY HISTORY OF BREAST CANCER: ICD-10-CM

## 2024-12-02 LAB
DEXA FRACTURE RISK HIP: NORMAL
DEXA FRACTURE RISK MAJOR: NORMAL
DEXA LT FEMNECK TSCORE: -0.4
DEXA LT FEMNECK ZSCORE: 0.4
DEXA LT HIP FRAX: NORMAL
DEXA LT MAJOR OSTEO FRAX: NORMAL
DEXA LT TOTFEM TSCORE: 0.3
DEXA LT TOTFEM ZSCORE: 0.8
DEXA SPINE L1,L4 T-SCORE: 0.2
DEXA SPINE L1,L4 Z-SCORE: 0.9
DEXA SPINE L1-L2 T-SCORE: 0.6
DEXA SPINE L1-L2 Z-SCORE: 1.3
DEXA SPINE L1-L4 T-SCORE: 0.4
DEXA SPINE L1-L4 Z-SCORE: 1.2
DEXA SPINE L2,L4 T-SCORE: 0.2
DEXA SPINE L2-L3 T-SCORE: 0.6
DEXA SPINE L2-L3 Z-SCORE: 1.4
DEXA SPINE L3-L4 T-SCORE: 0.2
DEXA SPINE L3-L4 Z-SCORE: 1.1

## 2024-12-02 PROCEDURE — 77080 DXA BONE DENSITY AXIAL: CPT

## 2024-12-04 ENCOUNTER — TELEPHONE (OUTPATIENT)
Dept: OTOLARYNGOLOGY | Facility: CLINIC | Age: 49
End: 2024-12-04

## 2024-12-04 NOTE — TELEPHONE ENCOUNTER
Patient calling stating that her insurance is requesting for her to have her MRI done at a different place is called  [facility] . [facility] is requesting a copy of the order to be fax to them.      Fax number [phone]  Please update patient once it has been faxed.

## 2024-12-23 ENCOUNTER — HOSPITAL ENCOUNTER (OUTPATIENT)
Dept: CT IMAGING | Age: 49
Discharge: HOME | End: 2024-12-23
Attending: OBSTETRICS & GYNECOLOGY

## 2024-12-23 DIAGNOSIS — R91.8 PULMONARY MASS: ICD-10-CM

## 2024-12-23 DIAGNOSIS — R93.5 ABNORMAL ABDOMINAL ULTRASOUND: ICD-10-CM

## 2024-12-23 DIAGNOSIS — C54.1 ENDOMETRIAL ADENOCARCINOMA  (CMD): ICD-10-CM

## 2024-12-23 DIAGNOSIS — R22.2 MASS OF ANTERIOR ABDOMINAL WALL: ICD-10-CM

## 2024-12-23 PROCEDURE — 10002805 HB CONTRAST AGENT: Performed by: OBSTETRICS & GYNECOLOGY

## 2024-12-23 PROCEDURE — 74177 CT ABD & PELVIS W/CONTRAST: CPT

## 2024-12-23 PROCEDURE — 71260 CT THORAX DX C+: CPT

## 2024-12-23 RX ADMIN — IOHEXOL 80 ML: 350 INJECTION, SOLUTION INTRAVENOUS at 09:12

## 2024-12-25 ENCOUNTER — HOSPITAL ENCOUNTER (OUTPATIENT)
Age: 49
Discharge: HOME | End: 2024-12-25
Payer: COMMERCIAL

## 2024-12-25 VITALS
SYSTOLIC BLOOD PRESSURE: 145 MMHG | TEMPERATURE: 98 F | OXYGEN SATURATION: 100 % | DIASTOLIC BLOOD PRESSURE: 77 MMHG | RESPIRATION RATE: 20 BRPM | HEART RATE: 74 BPM

## 2024-12-25 DIAGNOSIS — J06.9 VIRAL UPPER RESPIRATORY TRACT INFECTION WITH COUGH: Primary | ICD-10-CM

## 2024-12-25 DIAGNOSIS — Z20.822 ENCOUNTER FOR LABORATORY TESTING FOR COVID-19 VIRUS: ICD-10-CM

## 2024-12-25 DIAGNOSIS — R05.1 ACUTE COUGH: ICD-10-CM

## 2024-12-25 DIAGNOSIS — Z87.09 HISTORY OF ALLERGIC RHINITIS: ICD-10-CM

## 2024-12-25 DIAGNOSIS — H69.91 EUSTACHIAN TUBE DYSFUNCTION, RIGHT: ICD-10-CM

## 2024-12-25 LAB
POCT INFLUENZA A: NEGATIVE
POCT INFLUENZA B: NEGATIVE
S PYO AG THROAT QL: NEGATIVE
SARS-COV-2 RNA RESP QL NAA+PROBE: NOT DETECTED

## 2024-12-25 PROCEDURE — 99213 OFFICE O/P EST LOW 20 MIN: CPT | Performed by: PHYSICIAN ASSISTANT

## 2024-12-25 PROCEDURE — U0002 COVID-19 LAB TEST NON-CDC: HCPCS | Performed by: PHYSICIAN ASSISTANT

## 2024-12-25 PROCEDURE — 87880 STREP A ASSAY W/OPTIC: CPT | Performed by: PHYSICIAN ASSISTANT

## 2024-12-25 PROCEDURE — 87502 INFLUENZA DNA AMP PROBE: CPT | Performed by: PHYSICIAN ASSISTANT

## 2024-12-25 NOTE — ED PROVIDER NOTES
Patient Seen in: [facility]      History     Chief Complaint   Patient presents with    Sore Throat     Stated Complaint: Sore Throat    Subjective:   HPI      Patient is a 49-year-old female with environmental allergies, allergic rhinitis, asymmetric hearing loss with pressure right ear, presenting to immediate care for evaluation of sore throat.  Onset: Yesterday.  She endorses right ear pain/fullness with occasional popping with associated slight nasal congestion, sore throat, and nonproductive cough.  Took Mucinex for symptoms.  Overall feels well.  She is coming to immediate care for further evaluation.  Wants to make sure no strep throat or other infection.  No fevers or chills or myalgias.  No facial swelling.  No trismus or drooling.  No neck pain/stiffness.  No chest pain or shortness of breath.  No weakness or confusion.    Objective:     Past Medical History:    Cancer (HCC)    endometrial cancer              Past Surgical History:   Procedure Laterality Date    Hysterectomy  2016                Social History     Socioeconomic History    Marital status: Single   Tobacco Use    Smoking status: Never    Smokeless tobacco: Never   Vaping Use    Vaping status: Never Used   Substance and Sexual Activity    Alcohol use: Never    Drug use: Never              Review of Systems   Constitutional:  Negative for chills and fever.   HENT:  Positive for congestion and ear pain. Negative for facial swelling, trouble swallowing and voice change.    Respiratory:  Positive for cough. Negative for shortness of breath.    Cardiovascular:  Negative for chest pain.   Gastrointestinal:  Negative for abdominal pain, nausea and vomiting.   Musculoskeletal:  Negative for neck pain and neck stiffness.   Skin:  Negative for rash.   Allergic/Immunologic: Negative for immunocompromised state.   Neurological:  Negative for weakness.   Hematological:  Does not bruise/bleed easily.   Psychiatric/Behavioral:  Negative for  confusion.    All other systems reviewed and are negative.      Positive for stated complaint: Sore Throat  Other systems are as noted in HPI.  Constitutional and vital signs reviewed.      All other systems reviewed and negative except as noted above.    Physical Exam     ED Triage Vitals [12/25/24 0813]   /77   Pulse 74   Resp 20   Temp 98 °F (36.7 °C)   Temp src Oral   SpO2 100 %   O2 Device None (Room air)       Current Vitals:   Vital Signs  BP: 145/77  Pulse: 74  Resp: 20  Temp: 98 °F (36.7 °C)  Temp src: Oral    Oxygen Therapy  SpO2: 100 %  O2 Device: None (Room air)        Physical Exam  Vitals and nursing note reviewed.   Constitutional:       General: She is not in acute distress.     Appearance: She is well-developed. She is not ill-appearing, toxic-appearing or diaphoretic.   HENT:      Head: Normocephalic and atraumatic.      Right Ear: A middle ear effusion is present. Tympanic membrane is not erythematous.      Ears:      Comments: Right ear effusion without infection.  TM without erythema or bulging TM.  Ear canal without swelling or redness or drainage     Nose:      Comments: Nasal congestion, postnasal drip.     Mouth/Throat:      Mouth: Mucous membranes are moist.      Pharynx: Uvula midline.      Comments: Uvula midline.  Postnasal drip.  No oropharyngeal edema.  Tonsils not swollen without erythema or exudates.  No trismus or drooling.  Eyes:      General: No scleral icterus.  Cardiovascular:      Rate and Rhythm: Normal rate and regular rhythm.   Pulmonary:      Effort: Pulmonary effort is normal. No respiratory distress.      Breath sounds: Normal breath sounds.      Comments: Lungs are clear to auscultation bilateral no rales or wheezing.  Musculoskeletal:         General: No tenderness or deformity. Normal range of motion.      Cervical back: Normal range of motion. No rigidity.   Skin:     Findings: No rash.   Neurological:      General: No focal deficit present.      Mental Status:  She is alert and oriented to person, place, and time.      Motor: No weakness.   Psychiatric:         Mood and Affect: Mood normal.         Behavior: Behavior normal.           ED Course     Labs Reviewed   POCT RAPID STREP - Normal   POCT FLU TEST - Normal    Narrative:     This assay is a rapid molecular in vitro test utilizing nucleic acid amplification of influenza A and B viral RNA.   RAPID SARS-COV-2 BY PCR - Normal       Results for orders placed or performed during the hospital encounter of 12/25/24   POCT Flu Test    Collection Time: 12/25/24  8:22 AM    Specimen: Nares; Other   Result Value Ref Range    POCT INFLUENZA A Negative Negative    POCT INFLUENZA B Negative Negative   Rapid SARS-CoV-2 by PCR    Collection Time: 12/25/24  8:22 AM    Specimen: Nares; Other   Result Value Ref Range    Rapid SARS-CoV-2 by PCR Not Detected Not Detected   POCT Rapid Strep    Collection Time: 12/25/24  8:29 AM   Result Value Ref Range    POCT Rapid Strep Negative Negative            MDM       Differential diagnoses considered included, but are not exclusive of: URI, influenza, COVID, otitis, sinusitis, pharyngitis, strep throat, bronchitis, pneumonia, etc.    Dx: Viral URI with cough and congestion, initial encounter  Dx: Acute viral pharyngitis, Initial Encounter  Strep negative  COVID and Influenza testing negative  Overall well-appearing  Hemodynamically stable  Afebrile  Tolerating PO  Outpatient management  Supportive care  Rest  Oral hydration  Motrin/Tylenol as needed for pain/fever/myalgia  Mucinex for Anti-tussive  Cepacol lozenges for sore throat  Flonase and Claritin/zyrtec for allergic rhinitis/congestion  PCP follow  Return precaution  Discharge instructions viral URI    Medical Decision Making      Disposition and Plan     Clinical Impression:  1. Viral upper respiratory tract infection with cough    2. Encounter for laboratory testing for COVID-19 virus    3. Acute cough    4. History of allergic rhinitis     5. Eustachian tube dysfunction, right         Disposition:  Discharge  12/25/2024  8:48 am    Follow-up:  No follow-up provider specified.        Medications Prescribed:  Current Discharge Medication List              Supplementary Documentation:

## 2024-12-25 NOTE — ED INITIAL ASSESSMENT (HPI)
Sore throat since Tuesday morning and  right ear pain. Denies drainage in ear. Denies fevers or chills. + cough , post nasal drip

## 2025-01-07 ENCOUNTER — LAB ENCOUNTER (OUTPATIENT)
Dept: LAB | Facility: HOSPITAL | Age: 50
End: 2025-01-07
Attending: NURSE PRACTITIONER
Payer: COMMERCIAL

## 2025-01-07 ENCOUNTER — OFFICE VISIT (OUTPATIENT)
Facility: CLINIC | Age: 50
End: 2025-01-07
Payer: COMMERCIAL

## 2025-01-07 ENCOUNTER — TELEPHONE (OUTPATIENT)
Facility: CLINIC | Age: 50
End: 2025-01-07

## 2025-01-07 VITALS
WEIGHT: 257 LBS | BODY MASS INDEX: 43.87 KG/M2 | DIASTOLIC BLOOD PRESSURE: 84 MMHG | HEIGHT: 64 IN | HEART RATE: 75 BPM | SYSTOLIC BLOOD PRESSURE: 126 MMHG

## 2025-01-07 DIAGNOSIS — K62.5 RECTAL BLEEDING: ICD-10-CM

## 2025-01-07 DIAGNOSIS — Z12.11 SCREENING FOR COLON CANCER: Primary | ICD-10-CM

## 2025-01-07 DIAGNOSIS — K59.00 CONSTIPATION, UNSPECIFIED CONSTIPATION TYPE: ICD-10-CM

## 2025-01-07 LAB
ALBUMIN SERPL-MCNC: 4.8 G/DL (ref 3.2–4.8)
ALBUMIN/GLOB SERPL: 1.9 {RATIO} (ref 1–2)
ALP LIVER SERPL-CCNC: 148 U/L
ALT SERPL-CCNC: 74 U/L
ANION GAP SERPL CALC-SCNC: 8 MMOL/L (ref 0–18)
AST SERPL-CCNC: 52 U/L (ref ?–34)
BASOPHILS # BLD AUTO: 0.03 X10(3) UL (ref 0–0.2)
BASOPHILS NFR BLD AUTO: 0.6 %
BILIRUB SERPL-MCNC: 0.5 MG/DL (ref 0.3–1.2)
BUN BLD-MCNC: 12 MG/DL (ref 9–23)
BUN/CREAT SERPL: 17.1 (ref 10–20)
CALCIUM BLD-MCNC: 10.1 MG/DL (ref 8.7–10.4)
CHLORIDE SERPL-SCNC: 107 MMOL/L (ref 98–112)
CO2 SERPL-SCNC: 26 MMOL/L (ref 21–32)
CREAT BLD-MCNC: 0.7 MG/DL
DEPRECATED RDW RBC AUTO: 42.6 FL (ref 35.1–46.3)
EGFRCR SERPLBLD CKD-EPI 2021: 105 ML/MIN/1.73M2 (ref 60–?)
EOSINOPHIL # BLD AUTO: 0.06 X10(3) UL (ref 0–0.7)
EOSINOPHIL NFR BLD AUTO: 1.1 %
ERYTHROCYTE [DISTWIDTH] IN BLOOD BY AUTOMATED COUNT: 12.8 % (ref 11–15)
FASTING STATUS PATIENT QL REPORTED: NO
GLOBULIN PLAS-MCNC: 2.5 G/DL (ref 2–3.5)
GLUCOSE BLD-MCNC: 109 MG/DL (ref 70–99)
HCT VFR BLD AUTO: 40.9 %
HGB BLD-MCNC: 14.2 G/DL
IMM GRANULOCYTES # BLD AUTO: 0.01 X10(3) UL (ref 0–1)
IMM GRANULOCYTES NFR BLD: 0.2 %
LYMPHOCYTES # BLD AUTO: 1.18 X10(3) UL (ref 1–4)
LYMPHOCYTES NFR BLD AUTO: 22.1 %
MCH RBC QN AUTO: 31.6 PG (ref 26–34)
MCHC RBC AUTO-ENTMCNC: 34.7 G/DL (ref 31–37)
MCV RBC AUTO: 91.1 FL
MONOCYTES # BLD AUTO: 0.5 X10(3) UL (ref 0.1–1)
MONOCYTES NFR BLD AUTO: 9.3 %
NEUTROPHILS # BLD AUTO: 3.57 X10 (3) UL (ref 1.5–7.7)
NEUTROPHILS # BLD AUTO: 3.57 X10(3) UL (ref 1.5–7.7)
NEUTROPHILS NFR BLD AUTO: 66.7 %
OSMOLALITY SERPL CALC.SUM OF ELEC: 292 MOSM/KG (ref 275–295)
PLATELET # BLD AUTO: 248 10(3)UL (ref 150–450)
POTASSIUM SERPL-SCNC: 4.1 MMOL/L (ref 3.5–5.1)
PROT SERPL-MCNC: 7.3 G/DL (ref 5.7–8.2)
RBC # BLD AUTO: 4.49 X10(6)UL
SODIUM SERPL-SCNC: 141 MMOL/L (ref 136–145)
WBC # BLD AUTO: 5.4 X10(3) UL (ref 4–11)

## 2025-01-07 PROCEDURE — 85025 COMPLETE CBC W/AUTO DIFF WBC: CPT

## 2025-01-07 PROCEDURE — 36415 COLL VENOUS BLD VENIPUNCTURE: CPT

## 2025-01-07 PROCEDURE — 80053 COMPREHEN METABOLIC PANEL: CPT | Performed by: NURSE PRACTITIONER

## 2025-01-07 PROCEDURE — 99204 OFFICE O/P NEW MOD 45 MIN: CPT | Performed by: NURSE PRACTITIONER

## 2025-01-07 NOTE — PATIENT INSTRUCTIONS
1. Schedule colonoscopy with General Pool Endoscopist  Diagnosis: rectal bleeding, constipation, colon cancer screening   Sedation: MAC  Prep: split dose golytely    2.  bowel prep from pharmacy   You can pick the bowel prep up now and store in a cool, dry place in your home until your scheduled bowel prep start date.    3. Continue all medications as normal for your procedure. DO NOT TAKE: Any form of alcohol, recreational drugs and any forms of erectile dysfunction medications 24 hours prior to procedure.    4. Read all bowel prep instructions carefully. Bowel prep instructions can also be found online at:  [email or web address]     5. AVOID seeds, nuts, popcorn, raw fruits and vegetables for 5 days before procedure    6. If you start any NEW medication after your visit today, please notify us. Certain medications (like iron or weight loss medications) will need to be held before the procedure, or the procedure cannot be performed safely.

## 2025-01-07 NOTE — H&P
[facility] - Gastroenterology                                                                                                  Clinic History and Physical     Chief Complaint   Patient presents with    Hemorrhoids    Blood In Stool       Requesting physician or provider: HECTOR SOLOMON    HPI:   Edda Gerard is a 49 year old year-old female with history of endometrial cancer. The patient presents for blood in stool.     In October she had some severe constipation. Had some sharp rectal pain with blood in stool and with wiping. Saw something that looked like a clot once. Symptoms have improved for the past few weeks. Has to sit a lot for work.   No prior endoscopies.     Patient denies any GI symptoms of nausea, vomiting, heartburn, dyspepsia, dysphagia, hematemesis, abdominal pain, diarrhea, or melena.  Additionally there is no unintentional weight loss.      Last colonoscopy: none  Last EGD: none    NSAIDS: none  Tobacco: none  Alcohol: none  Marijuana: none  Illicit drugs: none    FH GI malignancy:  paternal grandfather colon cancer    No history of adverse reaction to sedation  No LILLY  No anticoagulants/antiplatelet  No pacemaker/defibrillator  No pain medications and/or sleep aides    Wt Readings from Last 6 Encounters:   01/07/25 257 lb (116.6 kg)          History, Medications, Allergies, ROS:      Past Medical History:    Cancer (HCC)    endometrial cancer      Past Surgical History:   Procedure Laterality Date    Hysterectomy  2016      Family Hx:   Family History   Problem Relation Age of Onset    Cancer Mother         breast cancer and endometrial cancer    Diabetes Mother     Hypertension Mother     Colon Polyps Mother     Cancer Father         bladder cancer    Other (Other) Father         macular degeneration    Colon Polyps Father     Colon Polyps Paternal Grandfather     Colon Cancer Neg       Social History:   Social History     Socioeconomic History    Marital status: Single   Tobacco Use     Smoking status: Never    Smokeless tobacco: Never   Vaping Use    Vaping status: Never Used   Substance and Sexual Activity    Alcohol use: Never    Drug use: Never        Medications (Active prior to today's visit):  Current Outpatient Medications   Medication Sig Dispense Refill    polyethylene glycol, PEG 3350-KCl-NaBcb-NaCl-NaSulf, 236 g Oral Recon Soln Take 4,000 mL by mouth As Directed. Take 2,000 mL the night before your procedure and 2,000 mL the morning of your procedure. 1 each 0       Allergies:  Allergies[1]    ROS:   CONSTITUTIONAL: negative for fevers, chills, sweats  EYES Negative for scleral icterus or redness, and diplopia  HEENT: Negative for hoarseness  RESPIRATORY: Negative for cough and severe shortness of breath  CARDIOVASCULAR: Negative for crushing sub-sternal chest pain  GASTROINTESTINAL: See HPI  GENITOURINARY: Negative for dysuria  MUSCULOSKELETAL: Negative for arthralgias and myalgias  SKIN: Negative for jaundice, rash or pruritus  NEUROLOGICAL: Negative for dizziness and headaches  BEHAVIOR/PSYCH: Negative for psychotic behavior      PHYSICAL EXAM:   Blood pressure 126/84, pulse 75, height 5' 4\" (1.626 m), weight 257 lb (116.6 kg).    GEN: Alert, no acute distress, well-nourished   HEENT: anicteric sclera, neck supple, trachea midline, MMM, no palpable or tender neck or supraclavicular lymph nodes  ABDOMEN: Soft, symmetrical, non-tender without distention or guarding. No scars or lesions. Aorta is without bruit or visible pulsation. Umbilicus is midline without herniation. Normoactive bowel sounds are present, No masses, hepatomegaly or splenomegaly noted.   MSK: No erythema, no warmth, no swelling of joints  SKIN: No jaundice, no erythema, no rashes, no lesions  HEMATOLOGIC: No bleeding, no bruising  NEURO: Alert and interactive, NOBLE  PSYCH: appropriate mood & affect    Labs/Imaging:   CT AP 12/12/24:   FINDINGS:  Prominent cardiac silhouette. No pleural effusion. No  airspace  consolidation.    No enhancing masses in the liver.    Focal mass in the right adrenal measuring 18.8 x 18 mm. Seen on the prior  examination. Incompletely evaluated with no delayed images. However stable  from prior examinations.    Myelolipoma left adrenal. Stable.    Aorta is normal in size.    No hydronephrosis.    Nonobstructing stone in the left kidney.    Oral contrast also given.    Small portacaval lymph nodes are present. Seen on the prior examination.  The most prominent measuring 15.9 x 14 mm.    No pelvic adenopathy.    Uterus surgically removed.    No free fluid in the pelvis.    Mild distention of the bladder.    Arthritic changes with disc space narrowing L5-S1.    Stable sclerotic lesion of the S1 vertebral body. Nonspecific.      IMPRESSION:    No change from the study of 12/19/2023. No definite evidence of metastatic  disease in the abdomen or pelvis.   .  ASSESSMENT/PLAN:   Edda Gerard is a 49 year old year-old female with history of endometrial cancer. The patient presents for blood in stool.     #constipation  #blood in stool  Discussed possible fissure vs. Hemorrhoid. No current rectal pain, is able to have daily bowel movement.   No recent labwork, CMP and CBC pending.   Had abdominal CT with no findings last month.   Will schedule colonoscopy.     #colorectal cancer screening: patient is considered average risk for colon cancer (No immediate family hx of colon cancer) and it is appropriate to proceed with screening colonoscopy.        Patient Instructions   1. Schedule colonoscopy with General Pool Endoscopist  Diagnosis: rectal bleeding, constipation, colon cancer screening   Sedation: MAC  Prep: split dose golytely    2.  bowel prep from pharmacy   You can pick the bowel prep up now and store in a cool, dry place in your home until your scheduled bowel prep start date.    3. Continue all medications as normal for your procedure. DO NOT TAKE: Any form of alcohol,  recreational drugs and any forms of erectile dysfunction medications 24 hours prior to procedure.    4. Read all bowel prep instructions carefully. Bowel prep instructions can also be found online at:  www.[facility].org/giprep     5. AVOID seeds, nuts, popcorn, raw fruits and vegetables for 5 days before procedure    6. If you start any NEW medication after your visit today, please notify us. Certain medications (like iron or weight loss medications) will need to be held before the procedure, or the procedure cannot be performed safely.             Endoscopy risk/benefit discussion: I have thoroughly discussed the risks, benefits, and alternatives of endoscopic evaluation with the patient, who demonstrated understanding. This includes the potential risks of bleeding, infection, pain, anesthesia complications, and perforation, which may result in prolonged hospitalization or surgical intervention. All of the patient’s questions were addressed to their satisfaction. The patient has chosen to proceed with the endoscopic procedure, including any necessary interventions such as polypectomy, biopsy, control of bleeding.      Orders This Visit:  Orders Placed This Encounter   Procedures    CBC W Differential W Platelet    Comp Metabolic Panel (14)       Meds This Visit:  Requested Prescriptions     Signed Prescriptions Disp Refills    polyethylene glycol, PEG 3350-KCl-NaBcb-NaCl-NaSulf, 236 g Oral Recon Soln 1 each 0     Sig: Take 4,000 mL by mouth As Directed. Take 2,000 mL the night before your procedure and 2,000 mL the morning of your procedure.       Imaging & Referrals:  None       TESSA Castillo    [facility] Gastroenterology  1/7/2025               [1]   Allergies  Allergen Reactions    Tree Nuts OTHER (SEE COMMENTS)     Libby nuts cause throat tightness.     Dust OTHER (SEE COMMENTS)     Sneezing and congestion    Mold OTHER (SEE COMMENTS)     Sneezing and congestion

## 2025-01-07 NOTE — TELEPHONE ENCOUNTER
Scheduled for:  Colonoscopy 08452  Provider Name:  Dr. Horn  Date:  5/12/2025  Location:   [facility]  Sedation:  MAC  Time:  12:30 PM - Patient is aware [facility] will call the day before with arrival time.  Prep:  Golytely  Meds/Allergies Reconciled?:  APN reviewed   Diagnosis with codes:  Colon cancer screening Z12.11; Constipation K59.00; Rectal bleeding K62.5  Was patient informed to call insurance with codes (Y/N):  Yes, I confirmed [facility] insurance with the patient.   Referral sent?:  Referral was sent at the time of electronic surgical scheduling.   EM or [facility] notified?:  I sent an electronic request to Endo Scheduling and received a confirmation today.   Medication Orders:  Hold multivitamins/supplements one week prior to procedure.  Misc Orders:  N/A     Further instructions given by staff:   I discussed the prep instructions with the patient which  verbally understood and is aware that I will send the instructions today.

## 2025-01-07 NOTE — TELEPHONE ENCOUNTER
Patient was seen in office today (1/7/2025) by TESSA Saleem. Provided patient with office number and prep instructions. Reviewed prep instructions with patient in office, verbalized understanding. Patient aware GI schedulers will call patient to schedule the procedure.      Procedure orders:    Schedule: Colonoscopy with General Pool Endoscopist   Diagnosis:     ICD-10-CM   1. Screening for colon cancer  Z12.11   2. Constipation, unspecified constipation type  K59.00   3. Rectal bleeding  K62.5      Sedation: MAC   Prep: Split GoLytely    Medication changes prior to procedure:   N/A

## 2025-01-18 ENCOUNTER — PATIENT MESSAGE (OUTPATIENT)
Facility: CLINIC | Age: 50
End: 2025-01-18

## 2025-01-21 NOTE — TELEPHONE ENCOUNTER
Dangelo Bañuelos,    The next step would still be the colonoscopy. I see that you aren't scheduled until May. I can forward to scheduling and see if they can add you to the wait list to get in sooner.     TESSA Castillo

## 2025-01-23 ENCOUNTER — TELEPHONE (OUTPATIENT)
Facility: CLINIC | Age: 50
End: 2025-01-23

## 2025-01-23 ENCOUNTER — PATIENT MESSAGE (OUTPATIENT)
Dept: OTOLARYNGOLOGY | Facility: CLINIC | Age: 50
End: 2025-01-23

## 2025-01-23 ENCOUNTER — TELEPHONE (OUTPATIENT)
Dept: OTOLARYNGOLOGY | Facility: CLINIC | Age: 50
End: 2025-01-23

## 2025-01-23 NOTE — TELEPHONE ENCOUNTER
Spoke to [facility] in [place]  Confirmed the fax number    Orders and demographics faxed   Fax #[phone]    Fax confirmation received

## 2025-01-23 NOTE — TELEPHONE ENCOUNTER
Patient wants Dr Sequeira to know that the MRI results of the ear will be faxed over to our office from [facility] in [place].

## 2025-01-23 NOTE — TELEPHONE ENCOUNTER
I called and spoke with patient  I let her know orders and demographics were faxed.    She verbalized understanding and had no further questions

## 2025-01-23 NOTE — TELEPHONE ENCOUNTER
Spoke to patient  She states she was received a call from [place] about \"receiving a fax\"    I let her know I faxed the number below and received a fax confirmation at 9:35am    She verbalized undestanding

## 2025-01-23 NOTE — TELEPHONE ENCOUNTER
Patient requesting ultrasound orders to be faxed to [facility] - patient requesting to make sure her contact information is on the fax.  For additional questions please call.  Thank you.

## 2025-01-28 ENCOUNTER — TELEPHONE (OUTPATIENT)
Facility: CLINIC | Age: 50
End: 2025-01-28

## 2025-01-28 RX ORDER — FLUTICASONE PROPIONATE 50 MCG
1 SPRAY, SUSPENSION (ML) NASAL DAILY
COMMUNITY

## 2025-01-28 NOTE — TELEPHONE ENCOUNTER
Message sent to Dr Sequeira asking if he received the results from [facility]? If not I will call them personally to ask them to refax the results to the office.     Dr. Sequeira confirmed he did get the results.

## 2025-01-28 NOTE — TELEPHONE ENCOUNTER
Also see 1/23/2025 telephone encounter - patient states [facility] has not received ultrasound orders and requesting RN to resend.  Please fax to [phone].  Patient is asking Rn to call once this has been done.  Thank you.

## 2025-01-29 NOTE — TELEPHONE ENCOUNTER
US Liver order faxed to [facility] at [phone]. Fax confirmation received at 12:14:36 PM.

## 2025-01-29 NOTE — TELEPHONE ENCOUNTER
Spoke to patient  Let her know the imaging orders were faxed and we received a confirmation    She verbalized understanding and was appreciative for the call.

## 2025-01-31 ENCOUNTER — TELEPHONE (OUTPATIENT)
Facility: CLINIC | Age: 50
End: 2025-01-31

## 2025-01-31 NOTE — TELEPHONE ENCOUNTER
Per referral - procedure is authorized    Spoke to Edda and let her know it is authorized  She verbalized understanding and appreciated the call

## 2025-02-03 ENCOUNTER — ANESTHESIA EVENT (OUTPATIENT)
Dept: ENDOSCOPY | Facility: HOSPITAL | Age: 50
End: 2025-02-03
Payer: COMMERCIAL

## 2025-02-03 ENCOUNTER — ANESTHESIA (OUTPATIENT)
Dept: ENDOSCOPY | Facility: HOSPITAL | Age: 50
End: 2025-02-03
Payer: COMMERCIAL

## 2025-02-03 ENCOUNTER — HOSPITAL ENCOUNTER (OUTPATIENT)
Facility: HOSPITAL | Age: 50
Setting detail: HOSPITAL OUTPATIENT SURGERY
Discharge: HOME | End: 2025-02-03
Attending: INTERNAL MEDICINE | Admitting: INTERNAL MEDICINE
Payer: COMMERCIAL

## 2025-02-03 DIAGNOSIS — Z12.11 SCREENING FOR COLON CANCER: ICD-10-CM

## 2025-02-03 DIAGNOSIS — K62.5 RECTAL BLEEDING: ICD-10-CM

## 2025-02-03 DIAGNOSIS — K59.00 CONSTIPATION, UNSPECIFIED CONSTIPATION TYPE: ICD-10-CM

## 2025-02-03 PROCEDURE — 0DBK8ZX EXCISION OF ASCENDING COLON, VIA NATURAL OR ARTIFICIAL OPENING ENDOSCOPIC, DIAGNOSTIC: ICD-10-PCS | Performed by: INTERNAL MEDICINE

## 2025-02-03 PROCEDURE — 45385 COLONOSCOPY W/LESION REMOVAL: CPT | Performed by: INTERNAL MEDICINE

## 2025-02-03 PROCEDURE — 0D5N8ZZ DESTRUCTION OF SIGMOID COLON, VIA NATURAL OR ARTIFICIAL OPENING ENDOSCOPIC: ICD-10-PCS | Performed by: INTERNAL MEDICINE

## 2025-02-03 PROCEDURE — 0DBM8ZX EXCISION OF DESCENDING COLON, VIA NATURAL OR ARTIFICIAL OPENING ENDOSCOPIC, DIAGNOSTIC: ICD-10-PCS | Performed by: INTERNAL MEDICINE

## 2025-02-03 PROCEDURE — 0DBN8ZX EXCISION OF SIGMOID COLON, VIA NATURAL OR ARTIFICIAL OPENING ENDOSCOPIC, DIAGNOSTIC: ICD-10-PCS | Performed by: INTERNAL MEDICINE

## 2025-02-03 PROCEDURE — 45380 COLONOSCOPY AND BIOPSY: CPT | Performed by: INTERNAL MEDICINE

## 2025-02-03 DEVICE — REPLAY HEMOSTASIS CLIP, 11MM SPAN
Type: IMPLANTABLE DEVICE | Status: FUNCTIONAL
Brand: REPLAY

## 2025-02-03 RX ORDER — SODIUM CHLORIDE, SODIUM LACTATE, POTASSIUM CHLORIDE, CALCIUM CHLORIDE 600; 310; 30; 20 MG/100ML; MG/100ML; MG/100ML; MG/100ML
INJECTION, SOLUTION INTRAVENOUS CONTINUOUS
Status: DISCONTINUED | OUTPATIENT
Start: 2025-02-03 | End: 2025-02-03

## 2025-02-03 RX ORDER — LIDOCAINE HYDROCHLORIDE 10 MG/ML
INJECTION, SOLUTION EPIDURAL; INFILTRATION; INTRACAUDAL; PERINEURAL AS NEEDED
Status: DISCONTINUED | OUTPATIENT
Start: 2025-02-03 | End: 2025-02-03 | Stop reason: SURG

## 2025-02-03 RX ADMIN — SODIUM CHLORIDE, SODIUM LACTATE, POTASSIUM CHLORIDE, CALCIUM CHLORIDE: 600; 310; 30; 20 INJECTION, SOLUTION INTRAVENOUS at 08:41:00

## 2025-02-03 RX ADMIN — LIDOCAINE HYDROCHLORIDE 50 MG: 10 INJECTION, SOLUTION EPIDURAL; INFILTRATION; INTRACAUDAL; PERINEURAL at 08:15:00

## 2025-02-03 RX ADMIN — SODIUM CHLORIDE, SODIUM LACTATE, POTASSIUM CHLORIDE, CALCIUM CHLORIDE: 600; 310; 30; 20 INJECTION, SOLUTION INTRAVENOUS at 08:15:00

## 2025-02-03 NOTE — ANESTHESIA POSTPROCEDURE EVALUATION
Patient: Edda Gerard    Procedure Summary       Date: 02/03/25 Room / Location: [facility] ENDOSCOPY 03 / [facility] ENDOSCOPY    Anesthesia Start: 0813 Anesthesia Stop:     Procedure: COLONOSCOPY with polypectomies Diagnosis:       Screening for colon cancer      Constipation, unspecified constipation type      Rectal bleeding      (polyps,hemmorhoids, avm)    Surgeons: Nura Horn MD Anesthesiologist: Kianna Love CRNA    Anesthesia Type: general, MAC ASA Status: 3            Anesthesia Type: general, MAC    Vitals Value Taken Time   /69 02/03/25 0853   Temp 98.6 02/03/25 0853   Pulse 70 02/03/25 0853   Resp 16 02/03/25 0853   SpO2 97 % 02/03/25 0853   Vitals shown include unfiled device data.    [facility] AN Post Evaluation:   Patient Evaluated in PACU  Patient Participation: complete - patient participated  Level of Consciousness: awake and alert  Pain Score: 0  Pain Management: adequate  Airway Patency:patent  Dental exam unchanged from preop  Yes    Nausea/Vomiting: none  Cardiovascular Status: acceptable  Respiratory Status: acceptable  Postoperative Hydration acceptable      KIANNA LOVE CRNA  2/3/2025 8:53 AM

## 2025-02-03 NOTE — OPERATIVE REPORT
[facility] Endoscopy Report  Date of procedure-February 3, 2025    Preoperative Diagnosis:  -Colon cancer screening  -Rectal bleeding  -Constipation      Postoperative Diagnosis:  -Colon polyps x 4  -Colon AVM x 1  -Small internal hemorrhoids      Procedure:    Colonoscopy       Surgeon:  Nura Horn M.D.    Anesthesia:  MAC     Technique:  After informed consent, the patient was placed in the left lateral recumbent position.  Digital rectal examination revealed no palpable intraluminal abnormalities.  An Olympus variable stiffness 190 series HD colonoscope was inserted into the rectum and advanced under direct vision by following the lumen to the cecum.  The colon was examined upon withdrawal in the left lateral position.    The procedure was well tolerated without immediate complication.      Findings:  The preparation of the colon was good.  The terminal ileum was examined for 4 cm and visually normal.  The ileocecal valve was well preserved. The visualized colonic mucosa from the cecum to the anal verge was normal with an intact vascular pattern.    Colon polyps x 4 removed as follows;  -Sigmoid x 2, both polyps were sessile approximately 4 mm in size and cold snare removed.  -Ascending x 1, diminutive removed by cold forceps technique.  -Descending x 1, diminutive removed by cold forceps technique.  All polypectomy sites inspected and found to be free bleeding specimens retrieved and sent for analysis.    Small AVM noted in the sigmoid, this was approximately 4 mm in size and treated with APC x 2 applications and placement of a single Endo Clip across the mucosal site.    Internal hemorrhoids noted on retroflexed view.    Estimated blood loss-insignificant  Specimens-see above    Impression:  -Colon polyps x 4  -Colon AVM x 1  -Small internal hemorrhoids    Recommendations:  - Post polypectomy instructions given  - Repeat colonoscopy in 3- 5 years  - Symptomatic treatment of  hemorrhoids          Nura Horn MD  2/3/2025  9:07 AM

## 2025-02-03 NOTE — H&P
History & Physical Examination    Patient Name: Edda Gerard  MRN: [id number]  [facility]: [id number]  YOB: 1975    Diagnosis:   Colon screening  Constipation  Rectal bleeding      Prescriptions Prior to Admission[1]  Current Facility-Administered Medications   Medication Dose Route Frequency    lactated ringers infusion   Intravenous Continuous       Allergies: Allergies[2]    Past Medical History:    Cancer (HCC)    endometrial cancer    Exposure to medical diagnostic radiation    Last treatment 11/2019    Visual impairment    glasses     Past Surgical History:   Procedure Laterality Date    Hysterectomy  2016     Family History   Problem Relation Age of Onset    Cancer Mother         breast cancer and endometrial cancer    Diabetes Mother     Hypertension Mother     Colon Polyps Mother     Cancer Father         bladder cancer    Other (Other) Father         macular degeneration    Colon Polyps Father     Colon Polyps Paternal Grandfather     Colon Cancer Neg      Social History     Tobacco Use    Smoking status: Never    Smokeless tobacco: Never   Substance Use Topics    Alcohol use: Never       SYSTEM Check if Review is Normal Check if Physical Exam is Normal If not normal, please explain:   HEENT [x ] [ x]    NECK & BACK [x ] [x ]    HEART [x ] [ x]    LUNGS [x ] [ x]    ABDOMEN [x ] [x ]    UROGENITAL [ ] [ ]    EXTREMITIES [x ] [x ]    OTHER        [ x ] I have discussed the risks and benefits and alternatives with the patient/family.  They understand and agree to proceed with plan of care.  [ x ] I have reviewed the History and Physical done within the last 30 days.  Any changes noted above.    Nura Horn MD  2/3/2025  8:02 AM         [1]   Medications Prior to Admission   Medication Sig Dispense Refill Last Dose/Taking    fluticasone propionate 50 MCG/ACT Nasal Suspension 1 spray by Each Nare route daily.   Taking    cholecalciferol 400 units/mL Oral Liquid Take 5 mL (2,000 Units  total) by mouth As Directed.       polyethylene glycol, PEG 3350-KCl-NaBcb-NaCl-NaSulf, 236 g Oral Recon Soln Take 4,000 mL by mouth As Directed. Take 2,000 mL the night before your procedure and 2,000 mL the morning of your procedure. 1 each 0    [2]   Allergies  Allergen Reactions    Tree Nuts OTHER (SEE COMMENTS)     Libby nuts cause throat tightness.     Dust OTHER (SEE COMMENTS)     Sneezing and congestion    Mold OTHER (SEE COMMENTS)     Sneezing and congestion

## 2025-02-03 NOTE — DISCHARGE INSTRUCTIONS
Home Care Instructions for Colonoscopy  Diet:  - Resume your regular diet as tolerated unless otherwise instructed.  - Start with light meals to minimize bloating.  - Do not drink alcohol today.    Medication:  - If you have questions about resuming your normal medications, please contact your Primary Care Physician.    Activities:  - Take it easy today. Do not return to work today.  - Do not drive today.  - Do not operate any machinery today (including kitchen equipment).    Colonoscopy:  - You may notice some rectal \"spotting\" (a little blood on the toilet tissue) for a day or two after the exam. This is normal.  - If you experience any rectal bleeding (not spotting), persistent tenderness or sharp severe abdominal pains, oral temperature over 100 degrees Fahrenheit, light-headedness or dizziness, or any other problems, contact your doctor.      **If unable to reach your doctor, please go to the [facility] Emergency Room**    - Your referring physician will receive a full report of your examination.  - If you do not hear from your doctor's office within two weeks of your biopsy, please call them for your results.    You may be able to see your laboratory results in [facility] between 4 and 7 business days.  In some cases, your physician may not have viewed the results before they are released to [facility].  If you have questions regarding your results contact the physician who ordered the test/exam by phone or via [facility] by choosing \"Ask a Medical Question.\"

## 2025-02-03 NOTE — ANESTHESIA PREPROCEDURE EVALUATION
Anesthesia PreOp Note    HPI:     Edda Gerard is a 50 year old female who presents for preoperative consultation requested by: Nura Horn MD    Date of Surgery: 2/3/2025    Procedure(s):  COLONOSCOPY  Indication: Screening for colon cancer \Constipation, unspecified constipation type \Rectal bleeding    Relevant Problems   No relevant active problems       NPO:  Last Liquid Consumption Date: 02/03/25  Last Liquid Consumption Time: 0445  Last Solid Consumption Date: 02/02/25  Last Solid Consumption Time: 0830  Last Liquid Consumption Date: 02/03/25          History Review:  There are no active problems to display for this patient.      Past Medical History:    Cancer (HCC)    endometrial cancer    Exposure to medical diagnostic radiation    Last treatment 11/2019    Visual impairment    glasses       Past Surgical History:   Procedure Laterality Date    Hysterectomy  2016       Prescriptions Prior to Admission[1]  Current Medications and Prescriptions Ordered in Epic[2]    Allergies[3]    Family History   Problem Relation Age of Onset    Cancer Mother         breast cancer and endometrial cancer    Diabetes Mother     Hypertension Mother     Colon Polyps Mother     Cancer Father         bladder cancer    Other (Other) Father         macular degeneration    Colon Polyps Father     Colon Polyps Paternal Grandfather     Colon Cancer Neg      Social History     Socioeconomic History    Marital status: Single   Tobacco Use    Smoking status: Never    Smokeless tobacco: Never   Vaping Use    Vaping status: Never Used   Substance and Sexual Activity    Alcohol use: Never    Drug use: Never       Available pre-op labs reviewed.  Lab Results   Component Value Date    WBC 5.4 01/07/2025    RBC 4.49 01/07/2025    HGB 14.2 01/07/2025    HCT 40.9 01/07/2025    MCV 91.1 01/07/2025    MCH 31.6 01/07/2025    MCHC 34.7 01/07/2025    RDW 12.8 01/07/2025    .0 01/07/2025     Lab Results   Component Value Date      01/07/2025    K 4.1 01/07/2025     01/07/2025    CO2 26.0 01/07/2025    BUN 12 01/07/2025    CREATSERUM 0.70 01/07/2025     (H) 01/07/2025    CA 10.1 01/07/2025          Vital Signs:  Body mass index is 42.77 kg/m².   height is 1.651 m (5' 5\") and weight is 116.6 kg (257 lb). Her blood pressure is 135/78 and her pulse is 58. Her respiration is 12 and oxygen saturation is 98%.   Vitals:    01/28/25 0913 02/03/25 0712   BP:  135/78   Pulse:  58   Resp:  12   SpO2:  98%   Weight: 116.6 kg (257 lb)    Height: 1.651 m (5' 5\")         Anesthesia Evaluation     Patient summary reviewed and Nursing notes reviewed    Airway   Mallampati: II  Dental - Dentition appears grossly intact     Pulmonary - negative ROS and normal exam   Cardiovascular - negative ROS and normal exam    Neuro/Psych - negative ROS     GI/Hepatic/Renal    (+) bowel prep    Endo/Other - negative ROS   Abdominal   (+) obese                 Anesthesia Plan:   ASA:  3  Plan:   General and MAC  Informed Consent Plan and Risks Discussed With:  Patient  Discussed plan with:  CRNA and surgeon      I have informed Edda Gerard and/or legal guardian or family member of the nature of the anesthetic plan, benefits, risks including possible dental damage if relevant, major complications, and any alternative forms of anesthetic management.   All of the patient's questions were answered to the best of my ability. The patient desires the anesthetic management as planned.  KIANNA ORO CRNA  2/3/2025 7:47 AM  Present on Admission:  **None**           [1]   Medications Prior to Admission   Medication Sig Dispense Refill Last Dose/Taking    fluticasone propionate 50 MCG/ACT Nasal Suspension 1 spray by Each Nare route daily.   Taking    cholecalciferol 400 units/mL Oral Liquid Take 5 mL (2,000 Units total) by mouth As Directed.       polyethylene glycol, PEG 3350-KCl-NaBcb-NaCl-NaSulf, 236 g Oral Recon Soln Take 4,000 mL by mouth As  Directed. Take 2,000 mL the night before your procedure and 2,000 mL the morning of your procedure. 1 each 0    [2]   Current Facility-Administered Medications Ordered in Epic   Medication Dose Route Frequency Provider Last Rate Last Admin    lactated ringers infusion   Intravenous Continuous Nura Horn MD 20 mL/hr at 02/03/25 0718 New Bag at 02/03/25 0718     No current [facility]-ordered outpatient medications on file.   [3]   Allergies  Allergen Reactions    Tree Nuts OTHER (SEE COMMENTS)     Libby nuts cause throat tightness.     Dust OTHER (SEE COMMENTS)     Sneezing and congestion    Mold OTHER (SEE COMMENTS)     Sneezing and congestion

## 2025-02-04 VITALS
HEIGHT: 65 IN | DIASTOLIC BLOOD PRESSURE: 82 MMHG | BODY MASS INDEX: 42.82 KG/M2 | WEIGHT: 257 LBS | HEART RATE: 52 BPM | SYSTOLIC BLOOD PRESSURE: 117 MMHG | OXYGEN SATURATION: 100 % | RESPIRATION RATE: 14 BRPM

## 2025-02-06 ENCOUNTER — TELEPHONE (OUTPATIENT)
Facility: CLINIC | Age: 50
End: 2025-02-06

## 2025-02-06 NOTE — TELEPHONE ENCOUNTER
----- Message from Nura Horn sent at 2/6/2025  2:21 PM CST -----  I wanted to get back to you with your colonoscopy results.  You had 4 colon polyps removed which were benign.  I would advise a repeat colonoscopy in 3 years to make sure no new polyps are forming.      A small blood vessel was noted on the colon and treated.     You also have internal hemorrhoids.  Please call with any questions.

## 2025-02-06 NOTE — TELEPHONE ENCOUNTER
Health Maintenance Updated    3 year colonoscopy recall entered into patient outreach in [facility].  Next colonoscopy will be due 2/3/2028.    Patient viewed below result note in MyChart:  Seen by patient Edda Gerard on 2/6/2025  5:31 PM

## 2025-03-10 ENCOUNTER — TELEPHONE (OUTPATIENT)
Facility: CLINIC | Age: 50
End: 2025-03-10

## 2025-03-10 DIAGNOSIS — K62.5 RECTAL BLEEDING: Primary | ICD-10-CM

## 2025-03-10 DIAGNOSIS — R79.89 ELEVATED LFTS: Primary | ICD-10-CM

## 2025-03-10 DIAGNOSIS — K80.20 CALCULUS OF GALLBLADDER WITHOUT CHOLECYSTITIS WITHOUT OBSTRUCTION: ICD-10-CM

## 2025-03-10 NOTE — TELEPHONE ENCOUNTER
Patient calling for her test results from her liver ultra sound that was faxed from [facility]. Please call at [phone],thanks.

## 2025-03-10 NOTE — TELEPHONE ENCOUNTER
Patient was informed to call office if she still had hemorrhoid issue, she could be referred to a speciality. Please call at [phone],thanks.

## 2025-03-10 NOTE — TELEPHONE ENCOUNTER
Harper    Please see below message.  I see in the [email or web address] message you sent her last month you had mentioned seeing Dr. Jaret Anne for the hemorrhoids.  Can you place a referral and I can let the patient know?  Usually the general surgery offices require a referral even if they have a ppo plan.    Thank you

## 2025-03-13 NOTE — TELEPHONE ENCOUNTER
Spoke to patient  Answered her questions regarding the referral and her US results    Patient verbalized understanding and had no further questions

## 2025-03-13 NOTE — TELEPHONE ENCOUNTER
Rolando I received the results from patients ultrasound  Please review below as soon as you can - there was a delay with [facility] sending us results and patient has been waiting.    Thank you

## 2025-03-24 ENCOUNTER — OFFICE VISIT (OUTPATIENT)
Dept: SURGERY | Facility: CLINIC | Age: 50
End: 2025-03-24
Payer: COMMERCIAL

## 2025-03-24 VITALS — BODY MASS INDEX: 42.82 KG/M2 | HEIGHT: 65 IN | WEIGHT: 257 LBS

## 2025-03-24 DIAGNOSIS — R10.9 ABDOMINAL PAIN, UNSPECIFIED ABDOMINAL LOCATION: Primary | ICD-10-CM

## 2025-03-24 DIAGNOSIS — K80.20 GALLSTONES: ICD-10-CM

## 2025-03-24 DIAGNOSIS — R79.89 ELEVATED LFTS: ICD-10-CM

## 2025-03-24 PROCEDURE — 99204 OFFICE O/P NEW MOD 45 MIN: CPT | Performed by: SURGERY

## 2025-03-24 NOTE — H&P (VIEW-ONLY)
Chief complaint:   Chief Complaint   Patient presents with    Abdominal Pain     Referred by Dr. Sara Bob for abdominal pain/gallbladder issues.        HPI: Edda is a delightful patient who presents for consult related to gallstones noted on liver US. She had mildly elevate AST, ALT, alk phos and an US was obtained for this reason. She does not presently have abdominal pain. She has a h.o constipation. No h/o J, FC.     Past medical history:   Past Medical History:    Cancer (HCC)    endometrial cancer    Exposure to medical diagnostic radiation    Last treatment 11/2019    Visual impairment    glasses       Past surgical history:   Past Surgical History:   Procedure Laterality Date    Colonoscopy      polyps,hemmorhoids, avm;     Colonoscopy N/A 2/3/2025    Procedure: COLONOSCOPY with polypectomies, argon plasma coagulation and clipping;  Surgeon: Nura Horn MD;  Location: [facility] ENDOSCOPY    Hysterectomy  2016       Allergies: Allergies[1]    Medications:   Current Outpatient Medications   Medication Sig Dispense Refill    cholecalciferol 400 units/mL Oral Liquid Take 5 mL (2,000 Units total) by mouth As Directed.      fluticasone propionate 50 MCG/ACT Nasal Suspension 1 spray by Each Nare route daily.         Social history:   Social History     Socioeconomic History    Marital status: Single   Tobacco Use    Smoking status: Never    Smokeless tobacco: Never   Vaping Use    Vaping status: Never Used   Substance and Sexual Activity    Alcohol use: Never    Drug use: Never        Family history:  Family History   Problem Relation Age of Onset    Cancer Mother         breast cancer and endometrial cancer    Diabetes Mother     Hypertension Mother     Colon Polyps Mother     Cancer Father         bladder cancer    Other (Other) Father         macular degeneration    Colon Polyps Father     Colon Polyps Paternal Grandfather     Colon Cancer Neg         Review of Systems:   GENERAL: feels generally  well  SKIN: no ulcerated or worrisome skin lesions  EYES:denies blurred vision or double vision  HEENT: denies new nasal congestion, sinus pain or ST  LUNGS: denies shortness of breath with exertion  CARDIOVASCULAR: denies chest pain on exertion  GI: no hematemesis, no BRBPR, no worsening heartburn  : no dysuria, no blood in urine, no difficulty urinating  MUSCULOSKELETAL: no new musculoskeletal complaints  NEURO: no persistent, recurrent  headaches  PSYCHE:no depression or anxiety  HEMATOLOGIC: no hx of blood dyscrasia  ENDOCRINE: no new endocrine problems  ALL/ASTHMA: no new hx of severe allergy or asthma  BACK: normal, no spinal deformity, no CVA tenderness    Physical examination:     Constitutional: appears well hydrated alert and responsive no acute distress noted  HEENT wnl, anicteric, PERRL, normocephalic, atraumatic  Neck supple, norm ROM, no JVD  L CTA B  H Reg rate  Abd soft, NT, ND, no masses, no hernias, no HSM.  Extr no c/c/e  Skin intact, no jaundice, no rashes, no lesions  Neuro grossly intact, no focal deficits, no tremors  Back no deformity, no CVA tnd.         Assessment and plan:  Diagnoses and all orders for this visit:    Abdominal pain, unspecified abdominal location  -     Surgical Case Request; Future  -     Surgical Case Change Request - ELURST    Gallstones    Elevated LFTs  -     Comp Metabolic Panel (14); Future       Plan laparoscopic cholecystectomy, possible open, possible IOC  Repeat  LFTs  We have discussed the surgical risks, benefits, alternatives, and expected recovery. All of the patient's questions have been answered to her satisfaction.      Adrianna Osuna MD  3/24/2025  1:11 PM       [1]   Allergies  Allergen Reactions    Tree Nuts OTHER (SEE COMMENTS)     Libby nuts cause throat tightness.     Dust OTHER (SEE COMMENTS)     Sneezing and congestion    Mold OTHER (SEE COMMENTS)     Sneezing and congestion

## 2025-03-24 NOTE — H&P
Chief complaint:   Chief Complaint   Patient presents with    Abdominal Pain     Referred by Dr. Sara Bob for abdominal pain/gallbladder issues.        HPI: Edda is a delightful patient who presents for consult related to gallstones noted on liver US. She had mildly elevate AST, ALT, alk phos and an US was obtained for this reason. She does not presently have abdominal pain. She has a h.o constipation. No h/o J, FC.     Past medical history:   Past Medical History:    Cancer (HCC)    endometrial cancer    Exposure to medical diagnostic radiation    Last treatment 11/2019    Visual impairment    glasses       Past surgical history:   Past Surgical History:   Procedure Laterality Date    Colonoscopy      polyps,hemmorhoids, avm;     Colonoscopy N/A 2/3/2025    Procedure: COLONOSCOPY with polypectomies, argon plasma coagulation and clipping;  Surgeon: Nura Horn MD;  Location: [facility] ENDOSCOPY    Hysterectomy  2016       Allergies: Allergies[1]    Medications:   Current Outpatient Medications   Medication Sig Dispense Refill    cholecalciferol 400 units/mL Oral Liquid Take 5 mL (2,000 Units total) by mouth As Directed.      fluticasone propionate 50 MCG/ACT Nasal Suspension 1 spray by Each Nare route daily.         Social history:   Social History     Socioeconomic History    Marital status: Single   Tobacco Use    Smoking status: Never    Smokeless tobacco: Never   Vaping Use    Vaping status: Never Used   Substance and Sexual Activity    Alcohol use: Never    Drug use: Never        Family history:  Family History   Problem Relation Age of Onset    Cancer Mother         breast cancer and endometrial cancer    Diabetes Mother     Hypertension Mother     Colon Polyps Mother     Cancer Father         bladder cancer    Other (Other) Father         macular degeneration    Colon Polyps Father     Colon Polyps Paternal Grandfather     Colon Cancer Neg         Review of Systems:   GENERAL: feels generally  well  SKIN: no ulcerated or worrisome skin lesions  EYES:denies blurred vision or double vision  HEENT: denies new nasal congestion, sinus pain or ST  LUNGS: denies shortness of breath with exertion  CARDIOVASCULAR: denies chest pain on exertion  GI: no hematemesis, no BRBPR, no worsening heartburn  : no dysuria, no blood in urine, no difficulty urinating  MUSCULOSKELETAL: no new musculoskeletal complaints  NEURO: no persistent, recurrent  headaches  PSYCHE:no depression or anxiety  HEMATOLOGIC: no hx of blood dyscrasia  ENDOCRINE: no new endocrine problems  ALL/ASTHMA: no new hx of severe allergy or asthma  BACK: normal, no spinal deformity, no CVA tenderness    Physical examination:     Constitutional: appears well hydrated alert and responsive no acute distress noted  HEENT wnl, anicteric, PERRL, normocephalic, atraumatic  Neck supple, norm ROM, no JVD  L CTA B  H Reg rate  Abd soft, NT, ND, no masses, no hernias, no HSM.  Extr no c/c/e  Skin intact, no jaundice, no rashes, no lesions  Neuro grossly intact, no focal deficits, no tremors  Back no deformity, no CVA tnd.         Assessment and plan:  Diagnoses and all orders for this visit:    Abdominal pain, unspecified abdominal location  -     Surgical Case Request; Future  -     Surgical Case Change Request - ELURST    Gallstones    Elevated LFTs  -     Comp Metabolic Panel (14); Future       Plan laparoscopic cholecystectomy, possible open, possible IOC  Repeat  LFTs  We have discussed the surgical risks, benefits, alternatives, and expected recovery. All of the patient's questions have been answered to her satisfaction.      Adrianna Osuna MD  3/24/2025  1:11 PM       [1]   Allergies  Allergen Reactions    Tree Nuts OTHER (SEE COMMENTS)     Libby nuts cause throat tightness.     Dust OTHER (SEE COMMENTS)     Sneezing and congestion    Mold OTHER (SEE COMMENTS)     Sneezing and congestion

## 2025-04-03 ENCOUNTER — TELEPHONE (OUTPATIENT)
Dept: SURGERY | Facility: CLINIC | Age: 50
End: 2025-04-03

## 2025-04-03 ENCOUNTER — OFFICE VISIT (OUTPATIENT)
Dept: SURGERY | Facility: CLINIC | Age: 50
End: 2025-04-03
Payer: COMMERCIAL

## 2025-04-03 VITALS
HEART RATE: 59 BPM | DIASTOLIC BLOOD PRESSURE: 78 MMHG | HEIGHT: 65 IN | BODY MASS INDEX: 42.82 KG/M2 | SYSTOLIC BLOOD PRESSURE: 140 MMHG | WEIGHT: 257 LBS

## 2025-04-03 DIAGNOSIS — K64.8 HEMORRHOIDS, COMPLICATED: Primary | ICD-10-CM

## 2025-04-03 PROCEDURE — 46221 LIGATION OF HEMORRHOID(S): CPT | Performed by: SURGERY

## 2025-04-03 NOTE — TELEPHONE ENCOUNTER
Per patient was discussing a biopsy today with Dr. Osuna and asking if it is possible to the biopsy during her gallbladder procedure. Please advise

## 2025-04-03 NOTE — PROCEDURES
4/3/2025    PREOP DX:  Complicated Internal Hemorrhoid, rectal bleed  POSTOP DX:  same  PROCEDURE: Internal Hemorrhoid Banding x 2  ANESTHESIA:  Local  EBL:  minimal    Description:  Edda Gerard is a  50 year old female who presents for  internal hemorrhoidal banding session in the office. The patient was placed on the anoscopy table. Three small polypoid nodules noted anteriorly. The anoscope was placed in the anus. The internal hemorrhoidal complex in the posterior position was grasped with the suction applicator and rubber bands were applied. This was repeated and the internal hemorrhoidal complex in the anterior position was grasped with the suction applicator and rubber bands were applied. Hemostasis was excellent. The patient tolerated the procedure well.    Follow up in 2 weeks for ongoing internal hemorrhoidal banding.

## 2025-04-09 ENCOUNTER — LAB ENCOUNTER (OUTPATIENT)
Dept: LAB | Facility: HOSPITAL | Age: 50
End: 2025-04-09
Attending: NURSE PRACTITIONER
Payer: COMMERCIAL

## 2025-04-09 DIAGNOSIS — R79.89 ELEVATED LFTS: ICD-10-CM

## 2025-04-09 LAB
ALBUMIN SERPL-MCNC: 4.5 G/DL (ref 3.2–4.8)
ALBUMIN/GLOB SERPL: 1.7 {RATIO} (ref 1–2)
ALP LIVER SERPL-CCNC: 171 U/L (ref 41–108)
ALT SERPL-CCNC: 29 U/L (ref 10–49)
ANION GAP SERPL CALC-SCNC: 7 MMOL/L (ref 0–18)
AST SERPL-CCNC: 31 U/L (ref ?–34)
BILIRUB DIRECT SERPL-MCNC: 0.1 MG/DL (ref ?–0.3)
BILIRUB SERPL-MCNC: 0.4 MG/DL (ref 0.3–1.2)
BUN BLD-MCNC: 15 MG/DL (ref 9–23)
BUN/CREAT SERPL: 21.7 (ref 10–20)
CALCIUM BLD-MCNC: 9.2 MG/DL (ref 8.7–10.4)
CHLORIDE SERPL-SCNC: 105 MMOL/L (ref 98–112)
CO2 SERPL-SCNC: 28 MMOL/L (ref 21–32)
CREAT BLD-MCNC: 0.69 MG/DL (ref 0.55–1.02)
EGFRCR SERPLBLD CKD-EPI 2021: 105 ML/MIN/1.73M2 (ref 60–?)
FASTING STATUS PATIENT QL REPORTED: YES
GLOBULIN PLAS-MCNC: 2.6 G/DL (ref 2–3.5)
GLUCOSE BLD-MCNC: 99 MG/DL (ref 70–99)
HAV AB SER QL IA: NONREACTIVE
HBV CORE AB SERPL QL IA: NONREACTIVE
HBV SURFACE AB SER QL: NONREACTIVE
HBV SURFACE AB SERPL IA-ACNC: <3.1 MIU/ML
HBV SURFACE AG SERPL QL IA: NONREACTIVE
HCV AB SERPL QL IA: NONREACTIVE
OSMOLALITY SERPL CALC.SUM OF ELEC: 291 MOSM/KG (ref 275–295)
POTASSIUM SERPL-SCNC: 3.9 MMOL/L (ref 3.5–5.1)
PROT SERPL-MCNC: 7.1 G/DL (ref 5.7–8.2)
SODIUM SERPL-SCNC: 140 MMOL/L (ref 136–145)

## 2025-04-09 PROCEDURE — 80503 PATH CLIN CONSLTJ SF 5-20: CPT | Performed by: NURSE PRACTITIONER

## 2025-04-09 PROCEDURE — 86708 HEPATITIS A ANTIBODY: CPT | Performed by: NURSE PRACTITIONER

## 2025-04-09 PROCEDURE — 86704 HEP B CORE ANTIBODY TOTAL: CPT | Performed by: NURSE PRACTITIONER

## 2025-04-09 PROCEDURE — 86803 HEPATITIS C AB TEST: CPT | Performed by: NURSE PRACTITIONER

## 2025-04-09 PROCEDURE — 36415 COLL VENOUS BLD VENIPUNCTURE: CPT

## 2025-04-09 PROCEDURE — 82248 BILIRUBIN DIRECT: CPT | Performed by: NURSE PRACTITIONER

## 2025-04-09 PROCEDURE — 86706 HEP B SURFACE ANTIBODY: CPT | Performed by: NURSE PRACTITIONER

## 2025-04-09 PROCEDURE — 87340 HEPATITIS B SURFACE AG IA: CPT | Performed by: NURSE PRACTITIONER

## 2025-04-09 PROCEDURE — 80053 COMPREHEN METABOLIC PANEL: CPT

## 2025-04-10 NOTE — DISCHARGE INSTRUCTIONS
HOME INSTRUCTIONS    Keep steri strips on, change gauze if saturated as needed, may leave gauze and tegaderm dressing on as long as it is dry and comfortable.  Low fat small meals, avoid dairy for 1-2 weeks  Tylenol and Ibuprofen should be adequate for pain  No lifting > 10 lbs  No driving  See Bradly Valdovinos or Jessica ALLEN  in approximately 2 wk  May use ice pack on incision prn  Shower starting tomorrow, no submersion in bath/hot tub/pool   Call with any concerns    ____________________________________________________       [facility] HOME CARE INSTRUCTIONS: POST-OP ANESTHESIA  The medication that you received for sedation or general anesthesia can last up to 24 hours. Your judgment and reflexes may be altered, even if you feel like your normal self.      We Recommend:   Do not drive any motor vehicle or bicycle   Avoid mowing the lawn, playing sports, or working with power tools/applicances (power saws, electric knives or mixers)   That you have someone stay with you on your first night home   Do not drink alcohol or take sleeping pills or tranquilizers   Do not sign legal documents within 24 hours of your procedure   If you had a nerve block for your surgery, take extra care not to put any pressure on your arm or hand for 24 hours    It is normal:  For you to have a sore throat if you had a breathing tube during surgery (while you were asleep!). The sore throat should get better within 48 hours. You can gargle with warm salt water (1/2 tsp in 4 oz warm water) or use a throat lozenge for comfort  To feel muscle aches or soreness especially in the abdomen, chest or neck. The achy feeling should go away in the next 24 hours  To feel weak, sleepy or \"wiped out\". Your should start feeling better in the next 24 hours.   To experience mild discomforts such as sore lip or tongue, headache, cramps, gas pains or a bloated feeling in your abdomen.   To experience mild back pain or soreness for a day or two if you had  spinal or epidural anesthesia.   If you had laparoscopic surgery, to feel shoulder pain or discomfort on the day of surgery.   For some patients to have nausea after surgery/anesthesia    If you feel nausea or experience vomiting:   Try to move around less.   Eat less than usual or drink only liquids until the next morning   Nausea should resolve in about 24 hours    If you have a problem when you are at home:    Call your surgeons office   Discharge Instructions: After Your Surgery  You’ve just had surgery. During surgery, you were given medicine called anesthesia to keep you relaxed and free of pain. After surgery, you may have some pain or nausea. This is common. Here are some tips for feeling better and getting well after surgery.   Going home  Your healthcare provider will show you how to take care of yourself when you go home. They'll also answer your questions. Have an adult family member or friend drive you home. For the first 24 hours after your surgery:   Don't drive or use heavy equipment.  Don't make important decisions or sign legal papers.  Take medicines as directed.  Don't drink alcohol.  Have someone stay with you, if needed. They can watch for problems and help keep you safe.  Be sure to go to all follow-up visits with your healthcare provider. And rest after your surgery for as long as your provider tells you to.   Coping with pain  If you have pain after surgery, pain medicine will help you feel better. Take it as directed, before pain becomes severe. Also, ask your healthcare provider or pharmacist about other ways to control pain. This might be with heat, ice, or relaxation. And follow any other instructions your surgeon or nurse gives you.      Stay on schedule with your medicine.     Tips for taking pain medicine  To get the best relief possible, remember these points:   Pain medicines can upset your stomach. Taking them with a little food may help.  Most pain relievers taken by mouth need at  least 20 to 30 minutes to start to work.  Don't wait till your pain becomes severe before you take your medicine. Try to time your medicine so that you can take it before starting an activity. This might be before you get dressed, go for a walk, or sit down for dinner.  Constipation is a common side effect of some pain medicines. Call your healthcare provider before taking any medicines such as laxatives or stool softeners to help ease constipation. Also ask if you should skip any foods. Drinking lots of fluids and eating foods such as fruits and vegetables that are high in fiber can also help. Remember, don't take laxatives unless your surgeon has prescribed them.  Drinking alcohol and taking pain medicine can cause dizziness and slow your breathing. It can even be deadly. Don't drink alcohol while taking pain medicine.  Pain medicine can make you react more slowly to things. Don't drive or run machinery while taking pain medicine.  Your healthcare provider may tell you to take acetaminophen to help ease your pain. Ask them how much you're supposed to take each day. Acetaminophen or other pain relievers may interact with your prescription medicines or other over-the-counter (OTC) medicines. Some prescription medicines have acetaminophen and other ingredients in them. Using both prescription and OTC acetaminophen for pain can cause you to accidentally overdose. Read the labels on your OTC medicines with care. This will help you to clearly know the list of ingredients, how much to take, and any warnings. It may also help you not take too much acetaminophen. If you have questions or don't understand the information, ask your pharmacist or healthcare provider to explain it to you before you take the OTC medicine.   Managing nausea  Some people have an upset stomach (nausea) after surgery. This is often because of anesthesia, pain, or pain medicine, less movement of food in the stomach, or the stress of surgery. These  tips will help you handle nausea and eat healthy foods as you get better. If you were on a special food plan before surgery, ask your healthcare provider if you should follow it while you get better. Check with your provider on how your eating should progress. It may depend on the surgery you had. These general tips may help:   Don't push yourself to eat. Your body will tell you when to eat and how much.  Start off with clear liquids and soup. They're easier to digest.  Next try semi-solid foods as you feel ready. These include mashed potatoes, applesauce, and gelatin.  Slowly move to solid foods. Don’t eat fatty, rich, or spicy foods at first.  Don't force yourself to have 3 large meals a day. Instead eat smaller amounts more often.  Take pain medicines with a small amount of solid food, such as crackers or toast. This helps prevent nausea.  When to call your healthcare provider  Call your healthcare provider right away if you have any of these:   You still have too much pain, or the pain gets worse, after taking the medicine. The medicine may not be strong enough. Or there may be a complication from the surgery.  You feel too sleepy, dizzy, or groggy. The medicine may be too strong.  Side effects such as nausea or vomiting. Your healthcare provider may advise taking other medicines to .  Skin changes such as rash, itching, or hives. This may mean you have an allergic reaction. Your provider may advise taking other medicines.  The incision looks different (for instance, part of it opens up).  Bleeding or fluid leaking from the incision site, and weren't told to expect that.  Fever of 100.4°F (38°C) or higher, or as directed by your provider.  Call 911  Call 911 right away if you have:   Trouble breathing  Facial swelling    If you have obstructive sleep apnea   You were given anesthesia medicine during surgery to keep you comfortable and free of pain. After surgery, you may have more apnea spells because of this  medicine and other medicines you were given. The spells may last longer than normal.    At home:  Keep using the continuous positive airway pressure (CPAP) device when you sleep. Unless your healthcare provider tells you not to, use it when you sleep, day or night. CPAP is a common device used to treat obstructive sleep apnea.  Talk with your provider before taking any pain medicine, muscle relaxants, or sedatives. Your provider will tell you about the possible dangers of taking these medicines.  Contact your provider if your sleeping changes a lot even when taking medicines as directed.  [employer] last reviewed this educational content on 10/1/2021  © 0393-5888 [employer]. All rights reserved. This information is not intended as a substitute for professional medical care. Always follow your healthcare professional's instructions.

## 2025-04-15 ENCOUNTER — ANESTHESIA EVENT (OUTPATIENT)
Dept: SURGERY | Facility: HOSPITAL | Age: 50
End: 2025-04-15
Payer: COMMERCIAL

## 2025-04-15 ENCOUNTER — ANESTHESIA (OUTPATIENT)
Dept: SURGERY | Facility: HOSPITAL | Age: 50
End: 2025-04-15
Payer: COMMERCIAL

## 2025-04-15 ENCOUNTER — HOSPITAL ENCOUNTER (OUTPATIENT)
Facility: HOSPITAL | Age: 50
Setting detail: HOSPITAL OUTPATIENT SURGERY
Discharge: HOME | End: 2025-04-15
Attending: SURGERY | Admitting: SURGERY
Payer: COMMERCIAL

## 2025-04-15 VITALS
DIASTOLIC BLOOD PRESSURE: 70 MMHG | BODY MASS INDEX: 40.97 KG/M2 | RESPIRATION RATE: 16 BRPM | HEART RATE: 57 BPM | SYSTOLIC BLOOD PRESSURE: 119 MMHG | HEIGHT: 64 IN | WEIGHT: 240 LBS | TEMPERATURE: 98 F | OXYGEN SATURATION: 97 %

## 2025-04-15 DIAGNOSIS — R52 PAIN: Primary | ICD-10-CM

## 2025-04-15 DIAGNOSIS — R10.9 ABDOMINAL PAIN, UNSPECIFIED ABDOMINAL LOCATION: ICD-10-CM

## 2025-04-15 PROCEDURE — 47562 LAPAROSCOPIC CHOLECYSTECTOMY: CPT | Performed by: SURGERY

## 2025-04-15 PROCEDURE — 0FT44ZZ RESECTION OF GALLBLADDER, PERCUTANEOUS ENDOSCOPIC APPROACH: ICD-10-PCS | Performed by: SURGERY

## 2025-04-15 RX ORDER — ROCURONIUM BROMIDE 10 MG/ML
INJECTION, SOLUTION INTRAVENOUS AS NEEDED
Status: DISCONTINUED | OUTPATIENT
Start: 2025-04-15 | End: 2025-04-15 | Stop reason: SURG

## 2025-04-15 RX ORDER — FAMOTIDINE 20 MG/1
20 TABLET, FILM COATED ORAL ONCE
Status: COMPLETED | OUTPATIENT
Start: 2025-04-15 | End: 2025-04-15

## 2025-04-15 RX ORDER — IBUPROFEN 600 MG/1
600 TABLET, FILM COATED ORAL EVERY 8 HOURS PRN
Qty: 30 TABLET | Refills: 0 | Status: SHIPPED | OUTPATIENT
Start: 2025-04-15

## 2025-04-15 RX ORDER — SODIUM CHLORIDE, SODIUM LACTATE, POTASSIUM CHLORIDE, CALCIUM CHLORIDE 600; 310; 30; 20 MG/100ML; MG/100ML; MG/100ML; MG/100ML
INJECTION, SOLUTION INTRAVENOUS CONTINUOUS
Status: DISCONTINUED | OUTPATIENT
Start: 2025-04-15 | End: 2025-04-15

## 2025-04-15 RX ORDER — ONDANSETRON 2 MG/ML
4 INJECTION INTRAMUSCULAR; INTRAVENOUS EVERY 6 HOURS PRN
Status: DISCONTINUED | OUTPATIENT
Start: 2025-04-15 | End: 2025-04-15

## 2025-04-15 RX ORDER — ACETAMINOPHEN 500 MG
1000 TABLET ORAL ONCE
Status: COMPLETED | OUTPATIENT
Start: 2025-04-15 | End: 2025-04-15

## 2025-04-15 RX ORDER — EPHEDRINE SULFATE 50 MG/ML
INJECTION, SOLUTION INTRAVENOUS AS NEEDED
Status: DISCONTINUED | OUTPATIENT
Start: 2025-04-15 | End: 2025-04-15 | Stop reason: SURG

## 2025-04-15 RX ORDER — MORPHINE SULFATE 4 MG/ML
4 INJECTION, SOLUTION INTRAMUSCULAR; INTRAVENOUS EVERY 10 MIN PRN
Status: DISCONTINUED | OUTPATIENT
Start: 2025-04-15 | End: 2025-04-15

## 2025-04-15 RX ORDER — LIDOCAINE HYDROCHLORIDE 10 MG/ML
INJECTION, SOLUTION EPIDURAL; INFILTRATION; INTRACAUDAL; PERINEURAL AS NEEDED
Status: DISCONTINUED | OUTPATIENT
Start: 2025-04-15 | End: 2025-04-15 | Stop reason: SURG

## 2025-04-15 RX ORDER — MORPHINE SULFATE 4 MG/ML
2 INJECTION, SOLUTION INTRAMUSCULAR; INTRAVENOUS EVERY 10 MIN PRN
Status: DISCONTINUED | OUTPATIENT
Start: 2025-04-15 | End: 2025-04-15

## 2025-04-15 RX ORDER — MIDAZOLAM HYDROCHLORIDE 1 MG/ML
INJECTION INTRAMUSCULAR; INTRAVENOUS AS NEEDED
Status: DISCONTINUED | OUTPATIENT
Start: 2025-04-15 | End: 2025-04-15 | Stop reason: SURG

## 2025-04-15 RX ORDER — GLYCOPYRROLATE 0.2 MG/ML
INJECTION, SOLUTION INTRAMUSCULAR; INTRAVENOUS AS NEEDED
Status: DISCONTINUED | OUTPATIENT
Start: 2025-04-15 | End: 2025-04-15 | Stop reason: SURG

## 2025-04-15 RX ORDER — ONDANSETRON 2 MG/ML
INJECTION INTRAMUSCULAR; INTRAVENOUS AS NEEDED
Status: DISCONTINUED | OUTPATIENT
Start: 2025-04-15 | End: 2025-04-15 | Stop reason: SURG

## 2025-04-15 RX ORDER — HYDROCODONE BITARTRATE AND ACETAMINOPHEN 5; 325 MG/1; MG/1
1 TABLET ORAL ONCE
Status: COMPLETED | OUTPATIENT
Start: 2025-04-15 | End: 2025-04-15

## 2025-04-15 RX ORDER — METOCLOPRAMIDE 10 MG/1
10 TABLET ORAL ONCE
Status: COMPLETED | OUTPATIENT
Start: 2025-04-15 | End: 2025-04-15

## 2025-04-15 RX ORDER — BUPIVACAINE HYDROCHLORIDE AND EPINEPHRINE 5; 5 MG/ML; UG/ML
INJECTION, SOLUTION PERINEURAL AS NEEDED
Status: DISCONTINUED | OUTPATIENT
Start: 2025-04-15 | End: 2025-04-15 | Stop reason: HOSPADM

## 2025-04-15 RX ORDER — HYDROMORPHONE HYDROCHLORIDE 1 MG/ML
0.2 INJECTION, SOLUTION INTRAMUSCULAR; INTRAVENOUS; SUBCUTANEOUS EVERY 5 MIN PRN
Status: DISCONTINUED | OUTPATIENT
Start: 2025-04-15 | End: 2025-04-15

## 2025-04-15 RX ORDER — FAMOTIDINE 10 MG/ML
20 INJECTION, SOLUTION INTRAVENOUS ONCE
Status: COMPLETED | OUTPATIENT
Start: 2025-04-15 | End: 2025-04-15

## 2025-04-15 RX ORDER — HYDROCODONE BITARTRATE AND ACETAMINOPHEN 5; 325 MG/1; MG/1
1 TABLET ORAL EVERY 6 HOURS PRN
Qty: 6 TABLET | Refills: 0 | Status: SHIPPED | OUTPATIENT
Start: 2025-04-15

## 2025-04-15 RX ORDER — PROCHLORPERAZINE EDISYLATE 5 MG/ML
5 INJECTION INTRAMUSCULAR; INTRAVENOUS EVERY 8 HOURS PRN
Status: DISCONTINUED | OUTPATIENT
Start: 2025-04-15 | End: 2025-04-15

## 2025-04-15 RX ORDER — HYDROMORPHONE HYDROCHLORIDE 1 MG/ML
0.4 INJECTION, SOLUTION INTRAMUSCULAR; INTRAVENOUS; SUBCUTANEOUS EVERY 5 MIN PRN
Status: DISCONTINUED | OUTPATIENT
Start: 2025-04-15 | End: 2025-04-15

## 2025-04-15 RX ORDER — DEXAMETHASONE SODIUM PHOSPHATE 4 MG/ML
VIAL (ML) INJECTION AS NEEDED
Status: DISCONTINUED | OUTPATIENT
Start: 2025-04-15 | End: 2025-04-15 | Stop reason: SURG

## 2025-04-15 RX ORDER — METOCLOPRAMIDE HYDROCHLORIDE 5 MG/ML
10 INJECTION INTRAMUSCULAR; INTRAVENOUS ONCE
Status: COMPLETED | OUTPATIENT
Start: 2025-04-15 | End: 2025-04-15

## 2025-04-15 RX ORDER — NALOXONE HYDROCHLORIDE 0.4 MG/ML
0.08 INJECTION, SOLUTION INTRAMUSCULAR; INTRAVENOUS; SUBCUTANEOUS AS NEEDED
Status: DISCONTINUED | OUTPATIENT
Start: 2025-04-15 | End: 2025-04-15

## 2025-04-15 RX ADMIN — EPHEDRINE SULFATE 10 MG: 50 INJECTION, SOLUTION INTRAVENOUS at 12:13:00

## 2025-04-15 RX ADMIN — ROCURONIUM BROMIDE 5 MG: 10 INJECTION, SOLUTION INTRAVENOUS at 12:34:00

## 2025-04-15 RX ADMIN — SODIUM CHLORIDE, SODIUM LACTATE, POTASSIUM CHLORIDE, CALCIUM CHLORIDE: 600; 310; 30; 20 INJECTION, SOLUTION INTRAVENOUS at 12:38:00

## 2025-04-15 RX ADMIN — SODIUM CHLORIDE, SODIUM LACTATE, POTASSIUM CHLORIDE, CALCIUM CHLORIDE: 600; 310; 30; 20 INJECTION, SOLUTION INTRAVENOUS at 11:49:00

## 2025-04-15 RX ADMIN — SODIUM CHLORIDE, SODIUM LACTATE, POTASSIUM CHLORIDE, CALCIUM CHLORIDE: 600; 310; 30; 20 INJECTION, SOLUTION INTRAVENOUS at 12:49:00

## 2025-04-15 RX ADMIN — LIDOCAINE HYDROCHLORIDE 50 MG: 10 INJECTION, SOLUTION EPIDURAL; INFILTRATION; INTRACAUDAL; PERINEURAL at 11:53:00

## 2025-04-15 RX ADMIN — ROCURONIUM BROMIDE 35 MG: 10 INJECTION, SOLUTION INTRAVENOUS at 11:53:00

## 2025-04-15 RX ADMIN — EPHEDRINE SULFATE 10 MG: 50 INJECTION, SOLUTION INTRAVENOUS at 12:07:00

## 2025-04-15 RX ADMIN — GLYCOPYRROLATE 0.2 MG: 0.2 INJECTION, SOLUTION INTRAMUSCULAR; INTRAVENOUS at 12:10:00

## 2025-04-15 RX ADMIN — MIDAZOLAM HYDROCHLORIDE 2 MG: 1 INJECTION INTRAMUSCULAR; INTRAVENOUS at 11:49:00

## 2025-04-15 RX ADMIN — DEXAMETHASONE SODIUM PHOSPHATE 8 MG: 4 MG/ML VIAL (ML) INJECTION at 12:01:00

## 2025-04-15 RX ADMIN — ROCURONIUM BROMIDE 10 MG: 10 INJECTION, SOLUTION INTRAVENOUS at 12:14:00

## 2025-04-15 RX ADMIN — EPHEDRINE SULFATE 10 MG: 50 INJECTION, SOLUTION INTRAVENOUS at 12:08:00

## 2025-04-15 RX ADMIN — ONDANSETRON 4 MG: 2 INJECTION INTRAMUSCULAR; INTRAVENOUS at 12:43:00

## 2025-04-15 RX ADMIN — SODIUM CHLORIDE, SODIUM LACTATE, POTASSIUM CHLORIDE, CALCIUM CHLORIDE: 600; 310; 30; 20 INJECTION, SOLUTION INTRAVENOUS at 12:04:00

## 2025-04-15 RX ADMIN — SODIUM CHLORIDE, SODIUM LACTATE, POTASSIUM CHLORIDE, CALCIUM CHLORIDE: 600; 310; 30; 20 INJECTION, SOLUTION INTRAVENOUS at 12:20:00

## 2025-04-15 NOTE — OPERATIVE REPORT
Patient Name:  Edda Gerard  MR:  [id number]  :  1975  DOS:  04/15/25    Preop Dx:  Abdominal pain, unspecified abdominal location [R10.9]  Postop Dx:  Abdominal pain, unspecified abdominal location [R10.9]  Procedure:  Laparoscopic cholecystectomy  Surgeon:  Adrianna Osuna MD  Surgical Assistant.: Shannan Salvador  PA: Michelle Steven PA-C, ADRIANA  EBL: Blood Output: 5 mL (4/15/2025 12:46 PM)    Complication:  None    INDICATION:  Pt is a 50 year old female who  has RUQ abdominal pain and a history of cholelithiasis and cholecystitis  who is scheduled for a Laparoscopic cholecystectomy.    CONSENT:  An informed consent discussion was held with the patient regarding the nature of Abdominal pain, unspecified abdominal location [R10.9], the treatment options and the details of the procedure.  The risks including but not limited to bleeding, wound infection, intra-abdominal infection, injury to the liver, colon, small intestine, pancreas, stomach, common bile duct, incomplete resection, cystic duct stump leak, retained stone and incisional hernia were discussed.  The patient expressed understanding and want to proceed with the planned procedure.    TECHNIQUE:  The patient was taken to the OR and placed in supine position.  General anesthesia was established and the abdomen was prepped in standard fashion.  Pneumoperitoneum was obtained using open technique through a supra-umbilical incision.  A 12-mm trocar was inserted under direct visualization and no injury occurred.  Examination of the abdomen showed a thickened and inflamed appearing gallbladder consistent with Abdominal pain, unspecified abdominal location [R10.9].  Two 5-mm trocars were placed in the epigastric and right abdomen.  The patient was placed in reverse Trendelenburg position.  The gallbladder was grasped and retracted cephalad and to the right.  The peritoneum along the medial and lateral aspect of the gallbladder/liver edge  were freed with the [place] dissector, small lymphatics were divided using the hook cautery.  The lower 1/3 of the gallbladder was dissected from the liver.  Two structures, identified as the cystic duct and artery, were visualized circumferentially with the 30 degree lens and noted to be entering the infundibulum, thus obtaining a critical view of safety.  The cystic duct and artery were clipped and divided.  The gallbladder was detached from the liver using hook cautery and delivered from the abdomen using an endocatch bag.  The abdominal cavity was irrigated with saline and found to be hemostatic.  The trocars were removed under direct visualization and no port site bleeding was seen.  The supra-umbilical fascia was closed using 0 vicryl.  The skin incisions were closed using 4-0 vicryl.  Sterile dressings were applied.  All instrument and sponge counts were correct.  I was present during the entire procedure and performed the operation.

## 2025-04-15 NOTE — ANESTHESIA PREPROCEDURE EVALUATION
Anesthesia PreOp Note    HPI:     Edda Gerard is a 50 year old female who presents for preoperative consultation requested by: Adrianna Osuna MD    Date of Surgery: 4/15/2025    Procedure(s):  Laparoscopic cholecystectomy, possible open, possible cholangiogram  Indication: Abdominal pain, unspecified abdominal location [R10.9]    Relevant Problems   No relevant active problems       NPO:  Last Liquid Consumption Date: 04/14/25  Last Liquid Consumption Time: 2200  Last Solid Consumption Date: 04/14/25  Last Solid Consumption Time: 1900  Last Liquid Consumption Date: 04/14/25          History Review:  Patient Active Problem List    Diagnosis Date Noted    Colon cancer screening 02/03/2025    Constipation 02/03/2025    Rectal bleeding 02/03/2025       Past Medical History[1]    Past Surgical History[2]    Prescriptions Prior to Admission[3]  Current Medications and Prescriptions Ordered in Epic[4]    Allergies[5]    Family History[6]  Social Hx on file[7]    Available pre-op labs reviewed.     Lab Results   Component Value Date     04/09/2025    K 3.9 04/09/2025     04/09/2025    CO2 28.0 04/09/2025    BUN 15 04/09/2025    CREATSERUM 0.69 04/09/2025    GLU 99 04/09/2025    CA 9.2 04/09/2025          Vital Signs:  Body mass index is 41.2 kg/m².   height is 1.626 m (5' 4\") and weight is 108.9 kg (240 lb). Her oral temperature is 97.8 °F (36.6 °C). Her blood pressure is 142/73 and her pulse is 56. Her respiration is 16 and oxygen saturation is 97%.   Vitals:    04/11/25 1333 04/15/25 1055   BP:  142/73   Pulse:  56   Resp:  16   Temp:  97.8 °F (36.6 °C)   TempSrc:  Oral   SpO2:  97%   Weight: 110.2 kg (243 lb) 108.9 kg (240 lb)   Height: 1.626 m (5' 4\") 1.626 m (5' 4\")        Anesthesia Evaluation      Airway   Mallampati: II  TM distance: >3 FB  Neck ROM: full  Dental          Pulmonary - negative ROS and normal exam   Cardiovascular - negative ROS and normal exam    Neuro/Psych - negative ROS      GI/Hepatic/Renal - negative ROS     Endo/Other - negative ROS   Abdominal                  Anesthesia Plan:   ASA:  2  Plan:   General  Post-op Pain Management: IV analgesics and Local  Plan Comments: I have discussed the anesthetic plan, major risks and alternatives with the patient and answered all questions. The patient desires to proceed with surgery and anesthesia as planned.     Informed Consent Plan and Risks Discussed With:  Patient      I have informed Edda Gerard and/or legal guardian or family member of the nature of the anesthetic plan, benefits, risks including possible dental damage if relevant, major complications, and any alternative forms of anesthetic management.   All of the patient's questions were answered to the best of my ability. The patient desires the anesthetic management as planned.  AMIE ZHANG DO  4/15/2025 11:46 AM  Present on Admission:  **None**           [1]   Past Medical History:   Cancer (HCC)    endometrial cancer    Exposure to medical diagnostic radiation    Last treatment 11/2019    Visual impairment    glasses   [2]   Past Surgical History:  Procedure Laterality Date    Colonoscopy N/A 02/03/2025    Procedure: COLONOSCOPY with polypectomies, argon plasma coagulation and clipping;  Surgeon: Nura Horn MD;  Location: [facility] ENDOSCOPY    Hysterectomy  2016   [3]   Medications Prior to Admission   Medication Sig Dispense Refill Last Dose/Taking    cholecalciferol 400 units/mL Oral Liquid Take 5 mL (2,000 Units total) by mouth As Directed.   More than a month   [4]   Current Facility-Administered Medications Ordered in Epic   Medication Dose Route Frequency Provider Last Rate Last Admin    lactated ringers infusion   Intravenous Continuous Adrianna Osuna MD 20 mL/hr at 04/15/25 1058 New Bag at 04/15/25 1058    ceFAZolin (Ancef) 2g in 10mL IV syringe premix  2 g Intravenous Once Adrianna Osuna MD         No current [facility]-ordered outpatient medications on  file.   [5]   Allergies  Allergen Reactions    Tree Nuts OTHER (SEE COMMENTS)     Libby nuts cause throat tightness.     Dust OTHER (SEE COMMENTS)     Sneezing and congestion    Mold OTHER (SEE COMMENTS)     Sneezing and congestion   [6]   Family History  Problem Relation Age of Onset    Cancer Mother         breast cancer and endometrial cancer    Diabetes Mother     Hypertension Mother     Colon Polyps Mother     Cancer Father         bladder cancer    Other (Other) Father         macular degeneration    Colon Polyps Father     Colon Polyps Paternal Grandfather     Colon Cancer Neg    [7]   Social History  Socioeconomic History    Marital status: Single   Tobacco Use    Smoking status: Never    Smokeless tobacco: Never   Vaping Use    Vaping status: Never Used   Substance and Sexual Activity    Alcohol use: Never    Drug use: Never

## 2025-04-15 NOTE — INTERVAL H&P NOTE
Pre-op Diagnosis: Abdominal pain, unspecified abdominal location [R10.9]    The above referenced H&P was reviewed by Adrianna Osuna MD on 4/15/2025, the patient was examined and no significant changes have occurred in the patient's condition since the H&P was performed.  I discussed with the patient and/or legal representative the potential benefits, risks and side effects of this procedure; the likelihood of the patient achieving goals; and potential problems that might occur during recuperation.  I discussed reasonable alternatives to the procedure, including risks, benefits and side effects related to the alternatives and risks related to not receiving this procedure.  We will proceed with procedure as planned.

## 2025-04-15 NOTE — ANESTHESIA PROCEDURE NOTES
Airway  Date/Time: 4/15/2025 11:57 AM  Reason: Elective      General Information and Staff   Patient location during procedure: OR  Anesthesiologist: Wili Amador DO  Performed: anesthesiologist   Performed by: Wili Amador DO  Authorized by: Wili Amador DO        Indications and Patient Condition  Indications for airway management: anesthesia  Sedation level: deep      Preoxygenated: yesPatient position: sniffing    Mask difficulty assessment: 1 - vent by mask    Final Airway Details    Final airway type: endotracheal airway    Successful airway: ETT  Cuffed: yes   Successful intubation technique: Video laryngoscopy  Endotracheal tube insertion site: oral  Blade size: #3  ETT size (mm): 7.5    Placement verified by: capnometry   Measured from: lips  ETT to lips (cm): 22  Number of attempts at approach: 1  Number of other approaches attempted: 0

## 2025-04-15 NOTE — ANESTHESIA POSTPROCEDURE EVALUATION
Patient: Edda Gerard    Procedure Summary       Date: 04/15/25 Room / Location: [facility] MAIN OR  / [facility] MAIN OR    Anesthesia Start: 1148 Anesthesia Stop: 1307    Procedure: Laparoscopic cholecystectomy (Abdomen) Diagnosis:       Abdominal pain, unspecified abdominal location      (Abdominal pain, unspecified abdominal location [R10.9])    Surgeons: Adrianna Osuna MD Anesthesiologist: Wili Amador DO    Anesthesia Type: general ASA Status: 2            Anesthesia Type: general    Vitals Value Taken Time   /121 04/15/25 13:07   Temp 97.2 °F (36.2 °C) 04/15/25 13:07   Pulse 69 04/15/25 13:09   Resp 22 04/15/25 13:09   SpO2 98 % 04/15/25 13:09   Vitals shown include unfiled device data.    [facility] AN Post Evaluation:   Patient Evaluated in PACU  Patient Participation: complete - patient participated  Level of Consciousness: awake  Pain Management: adequate  Airway Patency:patent  Dental exam unchanged from preop  Yes    Nausea/Vomiting: none  Cardiovascular Status: acceptable  Respiratory Status: acceptable and nasal cannula  Postoperative Hydration acceptable      WILI AMADOR DO  4/15/2025 1:10 PM

## 2025-04-26 ENCOUNTER — HOSPITAL ENCOUNTER (OUTPATIENT)
Dept: CT IMAGING | Facility: HOSPITAL | Age: 50
Discharge: HOME | End: 2025-04-26
Attending: INTERNAL MEDICINE

## 2025-04-26 DIAGNOSIS — Z13.6 SCREENING FOR CARDIOVASCULAR CONDITION: ICD-10-CM

## 2025-04-30 ENCOUNTER — NURSE ONLY (OUTPATIENT)
Dept: SURGERY | Facility: CLINIC | Age: 50
End: 2025-04-30
Payer: COMMERCIAL

## 2025-04-30 VITALS — DIASTOLIC BLOOD PRESSURE: 82 MMHG | HEART RATE: 56 BPM | SYSTOLIC BLOOD PRESSURE: 139 MMHG

## 2025-04-30 DIAGNOSIS — Z48.89 ENCOUNTER FOR POSTOPERATIVE CARE: Primary | ICD-10-CM

## 2025-04-30 PROCEDURE — 99024 POSTOP FOLLOW-UP VISIT: CPT

## 2025-04-30 NOTE — PROGRESS NOTES
Follow Up Visit Note       Active Problems      1. Encounter for postoperative care          Chief Complaint   Chief Complaint   Patient presents with    Post-Op     Pt here for post op s/p lap dhaval on 4/15/2025.  Pt denies all complaints at current time.         History of Present Illness  49yo F presents for 2 week post laparoscopic dhaval 4/15/2025. She states she is doing well. She denies any pain, fever, chills. Reports mild tenderness to her right abdomen which she attributes to excessive twisting at work. Denies swelling, tenderness, or drainage at any incision sites. Reports mildly loose non bloody stool. Adherent to weight restriction of 10 lbs. Adherent to avoiding dairy and raw vegetables.     Allergies  Edda is allergic to tree nuts, dust, and mold.    Past Medical / Surgical / Social / Family History    The past medical and past surgical history have been reviewed by me today.    Past Medical History[1]  Past Surgical History[2]    The family history and social history have been reviewed by me today.    Family History[3]  Social Hx on file[4]   Medications - Current[5]     Review of Systems  The Review of Systems has been reviewed by me during today.  Review of Systems   Constitutional: Negative.    HENT: Negative.     Eyes: Negative.    Respiratory: Negative.     Cardiovascular: Negative.    Gastrointestinal: Negative.    Endocrine: Negative.    Genitourinary: Negative.    Musculoskeletal: Negative.    Skin: Negative.    Allergic/Immunologic: Negative.    Neurological: Negative.    Hematological: Negative.    Psychiatric/Behavioral: Negative.          Physical Findings   /82   Pulse 56   Physical Exam  Constitutional:       General: She is not in acute distress.     Appearance: Normal appearance.   Pulmonary:      Effort: Pulmonary effort is normal.   Abdominal:      General: Abdomen is flat. There is no distension.      Palpations: Abdomen is soft.      Tenderness: There is no abdominal  tenderness. There is no guarding or rebound.   Skin:     General: Skin is warm and dry.   Neurological:      Mental Status: She is alert.          Assessment   1. Encounter for postoperative care    Pt remains stable, incision sites healing well without overt signs of infection, erythema, edema, or drainage.     Plan   Advance to general diet as tolerated  Adhere to weight restriction of 10-15lbs for 2 more weeks  Return back if surgical site shows signs of infection, fever, chills, erythema, edema, or drainage.        No orders of the defined types were placed in this encounter.      Imaging & Referrals   None    Follow Up  No follow-ups on file.    KEVIN Little PA          [1]   Past Medical History:   Cancer (HCC)    endometrial cancer    Exposure to medical diagnostic radiation    Last treatment 11/2019    Visual impairment    glasses   [2]   Past Surgical History:  Procedure Laterality Date    Cholecystectomy  04/15/2025    Colonoscopy N/A 02/03/2025    Procedure: COLONOSCOPY with polypectomies, argon plasma coagulation and clipping;  Surgeon: Nura Horn MD;  Location: [facility] ENDOSCOPY    Hysterectomy  2016   [3]   Family History  Problem Relation Age of Onset    Cancer Mother         breast cancer and endometrial cancer    Diabetes Mother     Hypertension Mother     Colon Polyps Mother     Cancer Father         bladder cancer    Other (Other) Father         macular degeneration    Colon Polyps Father     Colon Polyps Paternal Grandfather     Colon Cancer Neg    [4]   Social History  Socioeconomic History    Marital status: Single   Tobacco Use    Smoking status: Never    Smokeless tobacco: Never   Vaping Use    Vaping status: Never Used   Substance and Sexual Activity    Alcohol use: Never    Drug use: Never   [5]   Current Outpatient Medications:     cholecalciferol 400 units/mL Oral Liquid, Take 5 mL (2,000 Units total) by mouth As Directed., Disp: , Rfl:      HYDROcodone-acetaminophen 5-325 MG Oral Tab, Take 1 tablet by mouth every 6 (six) hours as needed. (Patient not taking: Reported on 4/30/2025), Disp: 6 tablet, Rfl: 0    ibuprofen 600 MG Oral Tab, Take 1 tablet (600 mg total) by mouth every 8 (eight) hours as needed. (Patient not taking: Reported on 4/30/2025), Disp: 30 tablet, Rfl: 0

## 2025-04-30 NOTE — PROGRESS NOTES
Follow Up Visit Note       Active Problems      1. Encounter for postoperative care          Chief Complaint   Chief Complaint   Patient presents with    Post-Op     Pt here for post op s/p lap dhaval on 4/15/2025.  Pt denies all complaints at current time.         History of Present Illness  Edda Gerard is a 50 year old female who presents for Post-Op (Pt here for post op s/p lap dhaval on 4/15/2025.  Pt denies all complaints at current time.) She states she is doing well. She denies any pain, fever, chills. Reports mild tenderness to her right abdomen which she attributes to excessive twisting at work. Denies swelling, tenderness, or drainage at any incision sites. Reports mildly loose non bloody stool. Adherent to weight restriction of 10 lbs. Adherent to avoiding dairy and raw vegetables.       Allergies  Edda is allergic to tree nuts, dust, and mold.    Past Medical / Surgical / Social / Family History    The past medical and past surgical history have been reviewed by me today.    Past Medical History[1]  Past Surgical History[2]    The family history and social history have been reviewed by me today.    Family History[3]  Social Hx on file[4]   Medications - Current[5]     Review of Systems  The Review of Systems has been reviewed by me during today.  Review of Systems   Constitutional:  Negative for chills, fatigue and fever.   Respiratory:  Negative for shortness of breath.    Cardiovascular:  Negative for chest pain and leg swelling.   Gastrointestinal:  Negative for abdominal pain, constipation, diarrhea, nausea and vomiting.        Physical Findings   /82   Pulse 56   Physical Exam  Constitutional:       General: She is not in acute distress.     Appearance: She is not ill-appearing.   HENT:      Head: Normocephalic and atraumatic.   Eyes:      Extraocular Movements: Extraocular movements intact.      Conjunctiva/sclera: Conjunctivae normal.      Pupils: Pupils are equal, round, and  reactive to light.   Abdominal:      General: Abdomen is flat. There is no distension.      Palpations: Abdomen is soft.      Tenderness: There is no abdominal tenderness.   Skin:     General: Skin is warm.      Comments: incision C/D/I.   Neurological:      Mental Status: She is alert.          Assessment   1. Encounter for postoperative care      Pathology reviewed with the patient    Plan   Advance to general diet as tolerated  Adhere to weight restriction of 10-15lbs for 2 more weeks  Return back if surgical site shows signs of infection, fever, chills, erythema, edema, or drainage.        No orders of the defined types were placed in this encounter.      Imaging & Referrals   None    Follow Up  No follow-ups on file.  KEVIN Little PA            [1]   Past Medical History:   Cancer (HCC)    endometrial cancer    Exposure to medical diagnostic radiation    Last treatment 11/2019    Visual impairment    glasses   [2]   Past Surgical History:  Procedure Laterality Date    Cholecystectomy  04/15/2025    Colonoscopy N/A 02/03/2025    Procedure: COLONOSCOPY with polypectomies, argon plasma coagulation and clipping;  Surgeon: Nura Horn MD;  Location: [facility] ENDOSCOPY    Hysterectomy  2016   [3]   Family History  Problem Relation Age of Onset    Cancer Mother         breast cancer and endometrial cancer    Diabetes Mother     Hypertension Mother     Colon Polyps Mother     Cancer Father         bladder cancer    Other (Other) Father         macular degeneration    Colon Polyps Father     Colon Polyps Paternal Grandfather     Colon Cancer Neg    [4]   Social History  Socioeconomic History    Marital status: Single   Tobacco Use    Smoking status: Never    Smokeless tobacco: Never   Vaping Use    Vaping status: Never Used   Substance and Sexual Activity    Alcohol use: Never    Drug use: Never   [5]   Current Outpatient Medications:     cholecalciferol 400 units/mL Oral Liquid, Take 5 mL (2,000  Units total) by mouth As Directed., Disp: , Rfl:     HYDROcodone-acetaminophen 5-325 MG Oral Tab, Take 1 tablet by mouth every 6 (six) hours as needed. (Patient not taking: Reported on 4/30/2025), Disp: 6 tablet, Rfl: 0    ibuprofen 600 MG Oral Tab, Take 1 tablet (600 mg total) by mouth every 8 (eight) hours as needed. (Patient not taking: Reported on 4/30/2025), Disp: 30 tablet, Rfl: 0

## 2025-05-17 ENCOUNTER — OFFICE VISIT (OUTPATIENT)
Dept: ALLERGY | Facility: CLINIC | Age: 50
End: 2025-05-17

## 2025-05-17 ENCOUNTER — TELEPHONE (OUTPATIENT)
Dept: ALLERGY | Facility: CLINIC | Age: 50
End: 2025-05-17

## 2025-05-17 ENCOUNTER — NURSE ONLY (OUTPATIENT)
Dept: ALLERGY | Facility: CLINIC | Age: 50
End: 2025-05-17

## 2025-05-17 VITALS — HEIGHT: 64 IN | BODY MASS INDEX: 40.97 KG/M2 | WEIGHT: 240 LBS

## 2025-05-17 DIAGNOSIS — J30.89 SEASONAL AND PERENNIAL ALLERGIC RHINOCONJUNCTIVITIS: Primary | ICD-10-CM

## 2025-05-17 DIAGNOSIS — H10.10 SEASONAL AND PERENNIAL ALLERGIC RHINOCONJUNCTIVITIS: Primary | ICD-10-CM

## 2025-05-17 DIAGNOSIS — Z23 NEED FOR COVID-19 VACCINE: ICD-10-CM

## 2025-05-17 DIAGNOSIS — Z91.018 FOOD ALLERGY: ICD-10-CM

## 2025-05-17 DIAGNOSIS — Z23 FLU VACCINE NEED: ICD-10-CM

## 2025-05-17 DIAGNOSIS — J30.2 SEASONAL AND PERENNIAL ALLERGIC RHINOCONJUNCTIVITIS: Primary | ICD-10-CM

## 2025-05-17 PROCEDURE — 95024 IQ TESTS W/ALLERGENIC XTRCS: CPT | Performed by: ALLERGY & IMMUNOLOGY

## 2025-05-17 PROCEDURE — 95004 PERQ TESTS W/ALRGNC XTRCS: CPT | Performed by: ALLERGY & IMMUNOLOGY

## 2025-05-17 PROCEDURE — 99204 OFFICE O/P NEW MOD 45 MIN: CPT | Performed by: ALLERGY & IMMUNOLOGY

## 2025-05-17 RX ORDER — LEVOCETIRIZINE DIHYDROCHLORIDE 5 MG/1
5 TABLET, FILM COATED ORAL EVERY EVENING
Qty: 90 TABLET | Refills: 1 | Status: SHIPPED | OUTPATIENT
Start: 2025-05-17

## 2025-05-17 RX ORDER — AZELASTINE HYDROCHLORIDE, FLUTICASONE PROPIONATE 137; 50 UG/1; UG/1
1 SPRAY, METERED NASAL 2 TIMES DAILY
Qty: 3 EACH | Refills: 0 | Status: SHIPPED | OUTPATIENT
Start: 2025-05-17 | End: 2025-05-19

## 2025-05-17 NOTE — TELEPHONE ENCOUNTER
Patient called requesting to speak to someone from Dr. Guerra's office regarding medication that was prescribed and insurance not covering it. Patient wants to know how to apply for an exception.

## 2025-05-17 NOTE — PROGRESS NOTES
The following individual(s), Edda Gerard, verbally consents to be recorded using ambient AI listening technology and understand that they can each withdraw their consent to this listening technology at any point by asking the clinician to turn off or pause the recording:

## 2025-05-17 NOTE — PROGRESS NOTES
Edda Gerard is a 50 year old female.    HPI:     Chief Complaint   Patient presents with    Consult     Pt states she has been having \"ear congestion\", sinus issues, no recent antihistamines     Patient is a 50-year-old female who presents for allergy consultation upon referral of her PCP Dr. Bob with a chief complaint of allergies  No allergy medications listed.  Immunizations reviewed  Today patient reports      History of Present Illness  Edda Gerard is a 50 year old female who presents with chronic right ear congestion and hearing loss. She was referred by Dr. Sequeira for evaluation of chronic ear congestion and hearing loss.    She has experienced a two-year history of persistent congestion in her right ear. Initially diagnosed with an ear infection and treated with antibiotics, her symptoms temporarily improved but recurred. Further evaluation by an ENT specialist ruled out an ear infection and suggested TMJ as a possible cause. Despite taking anti-inflammatories, the congestion persisted.    A hearing test revealed hearing loss, and an MRI was performed to rule out a tumor, with no abnormalities found. She describes the sensation as a feeling of fullness, similar to pressure changes during flying, but without significant pain. She has experienced vertigo twice and a clicking sound in her ear once, which led to the MRI. No sinus or allergy testing has been done since childhood, despite having sinus problems.    She has not been on any allergy medications recently but previously used Zyrtec. She has not tried nasal sprays like Flonase or Astelin, nor has she used Singulair or Sudafed due to side effects. She has been using a Juan Medi rinse twice daily, which initially helped, but she discontinued it due to a busy schedule. No significant nasal or sinus congestion recently, although she feels drainage from the ear.    There is a family history of Meniere's disease, as her aunt is affected. No  current symptoms of tinnitus, further hearing loss, or significant nasal congestion. She has not experienced any rashes, asthma, eczema, or food allergies.         HISTORY:  Past Medical History[1]   Past Surgical History[2]   Family History[3]   Social History: Short Social Hx on File[4]     Medications (Active prior to today's visit):  Current Medications[5]    Allergies:  Allergies[6]      ROS:     Allergic/Immuno:  See HPI  Cardiovascular:  Negative for irregular heartbeat/palpitations, chest pain, edema  Constitutional:  Negative night sweats,weight loss, irritability and lethargy  Endocrine:  Negative for cold intolerance, polydipsia and polyphagia  ENMT:  Negative for ear drainage, hearing loss and nasal drainage  Eyes:  Negative for eye discharge and vision loss  Gastrointestinal:  Negative for abdominal pain, diarrhea and vomiting  Genitourinary:  Negative for dysuria and hematuria  Hema/Lymph:  Negative for easy bleeding and easy bruising  Integumentary:  Negative for pruritus and rash  Musculoskeletal:  Negative for joint symptoms  Neurological:  Negative for dizziness, seizures  Psychiatric:  Negative for inappropriate interaction and psychiatric symptoms  Respiratory:  Negative for cough, dyspnea and wheezing      PHYSICAL EXAM:   Constitutional: responsive, no acute distress noted  Head/Face: NC/Atraumatic  Eyes/Vision: conjunctiva and lids are normal extraocular motion is intact   Ears/Audiometry: tympanic membranes are normal bilaterally hearing is grossly intact  Nose/Mouth/Throat: nose and throat are clear mucous membranes are moist   Neck/Thyroid: neck is supple without adenopathy  Lymphatic: no abnormal cervical, supraclavicular or axillary adenopathy is noted  Respiratory: normal to inspection lungs are clear to auscultation bilaterally normal respiratory effort   Cardiovascular: regular rate and rhythm no murmurs, gallups, or rubs  Abdomen: soft non-tender non-distended  Skin/Hair: no unusual  rashes present  Extremities: no edema, cyanosis, or clubbing  Neurological:Oriented to time, place, person & situation       ASSESSMENT/PLAN:   Assessment   Encounter Diagnoses   Name Primary?    Seasonal and perennial allergic rhinoconjunctivitis Yes    Flu vaccine need     Need for COVID-19 vaccine     Food allergy        Assessment & Plan     Skin testing today to common indoor and outdoor environmental allergies was+ to trees    Skin testing today to common food allergens including milk egg white and yolk wheat soy almond walnut peanut was negative    Positive histamine control  Assessment & Plan  Eustachian tube dysfunction  Chronic right ear fullness and congestion for two years. Previous ENT evaluation ruled out ear infection and tumor. MRI showed no physical abnormalities. Possible Eustachian tube dysfunction or TMJ considered. No significant pain or clicking associated with TMJ. No current sinus or nasal congestion. Autoinflation provides temporary relief. No wax, fluid, or redness in the ear. Good light reflex. Potential causes include stress and allergies. She prefers to avoid Sudafed due to side effects.  - Order allergy testing to identify potential allergens contributing to congestion.  - Prescribe Dymista nasal spray (combination of antihistamine and steroid) to manage congestion.  - Prescribe Xyzal for additional antihistamine support.  - Instruct on autoinflation techniques to manage ear pressure.  - Consider follow-up with ENT if not improving with above measures to see if tympanostomy or ear tube would be warranted we will avoid Sudafed at this time.      Hearing loss  Hearing loss associated with right ear congestion. Previous hearing test indicated hearing loss, likely due to congestion rather than permanent damage. No further hearing loss reported.    Sinus problems/allergic rhinitis  Sinus problems without current significant nasal or sinus congestion. No prior sinus surgeries. Allergy testing  to assess potential sinus-related allergies.  - Order allergy testing to evaluate for sinus-related allergies.  Trial of Xyzal and Dymista    Vertigo  Vertigo episodes not currently active. Previous MRI ruled out structural causes. No recent episodes reported.    Flu vaccine in the fall recommended  COVID-vaccine booster in the fall recommended          Orders This Visit:  No orders of the defined types were placed in this encounter.      Meds This Visit:  Requested Prescriptions     Signed Prescriptions Disp Refills    levocetirizine 5 MG Oral Tab 90 tablet 1     Sig: Take 1 tablet (5 mg total) by mouth every evening.    Azelastine-Fluticasone (DYMISTA) 137-50 MCG/ACT Nasal Suspension 3 each 0     Si Squirt by Nasal route 2 (two) times daily.       Imaging & Referrals:  None     2025  Taran Guerra MD      If medication samples were provided today, they were provided solely for patient education and training related to self administration of these medications.  Teaching, instruction and sample was provided to the patient by myself.  Teaching included  a review of potential adverse side effects as well as potential efficacy.  Patient's questions were answered in regards to medication administration and dosing and potential side effects. Teaching was provided via the teach back method         [1]   Past Medical History:   Cancer (HCC)    endometrial cancer    Exposure to medical diagnostic radiation    Last treatment 2019    Visual impairment    glasses   [2]   Past Surgical History:  Procedure Laterality Date    Cholecystectomy  04/15/2025    Colonoscopy N/A 2025    Procedure: COLONOSCOPY with polypectomies, argon plasma coagulation and clipping;  Surgeon: Nura Horn MD;  Location: [facility]    Hysterectomy  2016   [3]   Family History  Problem Relation Age of Onset    Cancer Mother         breast cancer and endometrial cancer    Diabetes Mother     Hypertension Mother     Colon  Polyps Mother     Cancer Father         bladder cancer    Other (Other) Father         macular degeneration    Colon Polyps Father     Colon Polyps Paternal Grandfather     Colon Cancer Neg    [4]   Social History  Socioeconomic History    Marital status: Single   Tobacco Use    Smoking status: Never    Smokeless tobacco: Never   Vaping Use    Vaping status: Never Used   Substance and Sexual Activity    Alcohol use: Never    Drug use: Never   [5]   Current Outpatient Medications   Medication Sig Dispense Refill    levocetirizine 5 MG Oral Tab Take 1 tablet (5 mg total) by mouth every evening. 90 tablet 1    Azelastine-Fluticasone (DYMISTA) 137-50 MCG/ACT Nasal Suspension 1 Squirt by Nasal route 2 (two) times daily. 3 each 0    HYDROcodone-acetaminophen 5-325 MG Oral Tab Take 1 tablet by mouth every 6 (six) hours as needed. (Patient not taking: Reported on 5/17/2025) 6 tablet 0    ibuprofen 600 MG Oral Tab Take 1 tablet (600 mg total) by mouth every 8 (eight) hours as needed. (Patient not taking: Reported on 5/17/2025) 30 tablet 0    cholecalciferol 400 units/mL Oral Liquid Take 5 mL (2,000 Units total) by mouth As Directed. (Patient not taking: Reported on 5/17/2025)     [6]   Allergies  Allergen Reactions    Tree Nuts OTHER (SEE COMMENTS)     Libby nuts cause throat tightness.     Dust OTHER (SEE COMMENTS)     Sneezing and congestion    Mold OTHER (SEE COMMENTS)     Sneezing and congestion

## 2025-05-17 NOTE — PATIENT INSTRUCTIONS
Assessment & Plan  Eustachian tube dysfunction  Chronic right ear fullness and congestion for two years. Previous ENT evaluation ruled out ear infection and tumor. MRI showed no physical abnormalities. Possible Eustachian tube dysfunction or TMJ considered. No significant pain or clicking associated with TMJ. No current sinus or nasal congestion. Autoinflation provides temporary relief. No wax, fluid, or redness in the ear. Good light reflex. Potential causes include stress and allergies. She prefers to avoid Sudafed due to side effects.  - Order allergy testing to identify potential allergens contributing to congestion.  - Prescribe Dymista nasal spray (combination of antihistamine and steroid) to manage congestion.  - Prescribe Xyzal for additional antihistamine support.  - Instruct on autoinflation techniques to manage ear pressure.  - Consider follow-up with ENT if not improving with above measures to see if tympanostomy or ear tube would be warranted we will avoid Sudafed at this time.      Hearing loss  Hearing loss associated with right ear congestion. Previous hearing test indicated hearing loss, likely due to congestion rather than permanent damage. No further hearing loss reported.    Sinus problems/allergic rhinitis  Sinus problems without current significant nasal or sinus congestion. No prior sinus surgeries. Allergy testing to assess potential sinus-related allergies.  - Order allergy testing to evaluate for sinus-related allergies.  Trial of Xyzal and Dymista    Vertigo  Vertigo episodes not currently active. Previous MRI ruled out structural causes. No recent episodes reported.    Flu vaccine in the fall recommended  COVID-vaccine booster in the fall recommended          Orders This Visit:  No orders of the defined types were placed in this encounter.      Meds This Visit:  Requested Prescriptions     Signed Prescriptions Disp Refills    levocetirizine 5 MG Oral Tab 90 tablet 1     Sig: Take 1 tablet  (5 mg total) by mouth every evening.    Azelastine-Fluticasone (DYMISTA) 137-50 MCG/ACT Nasal Suspension 3 each 0     Si Squirt by Nasal route 2 (two) times daily.

## 2025-05-19 ENCOUNTER — PATIENT MESSAGE (OUTPATIENT)
Dept: ALLERGY | Facility: CLINIC | Age: 50
End: 2025-05-19

## 2025-05-19 RX ORDER — FLUTICASONE PROPIONATE 50 MCG
2 SPRAY, SUSPENSION (ML) NASAL DAILY
Qty: 3 EACH | Refills: 1 | Status: SHIPPED | OUTPATIENT
Start: 2025-05-19

## 2025-05-19 RX ORDER — AZELASTINE 1 MG/ML
2 SPRAY, METERED NASAL 2 TIMES DAILY
Qty: 3 EACH | Refills: 1 | Status: SHIPPED | OUTPATIENT
Start: 2025-05-19

## 2025-05-19 NOTE — TELEPHONE ENCOUNTER
Prescription for Astelin and Flonase both sent.  Start with Astelin 2 sprays per nostril twice a day.  If not improving over the next week then may add Flonase 2 sprays per nostril once a day

## 2025-05-19 NOTE — TELEPHONE ENCOUNTER
Spoke with patient. Verified name and date of birth. Informed patient our office spoke with her insurance and was informed by insurance Dymista is a plan exclusion; informed patient Dr. Guerra sent a prescription for Astelin and Flonase and gave directions for use. Patient verbalizes understanding.

## 2025-05-19 NOTE — TELEPHONE ENCOUNTER
Dr. Guerra please advise  Dymista not covered by insurance   Per insurance - medication is a plan exclusion   Alternatives include Azelastine, Flonase, Nasacort, or Rhinocort  Please advise alternative or if medication can be prescribed separately

## 2025-06-06 ENCOUNTER — PATIENT MESSAGE (OUTPATIENT)
Dept: ALLERGY | Facility: CLINIC | Age: 50
End: 2025-06-06

## 2025-06-07 RX ORDER — MONTELUKAST SODIUM 10 MG/1
10 TABLET ORAL NIGHTLY
Qty: 30 TABLET | Refills: 0 | Status: SHIPPED | OUTPATIENT
Start: 2025-06-07

## 2025-06-07 NOTE — TELEPHONE ENCOUNTER
RN sent [employer] message with Dr. Guerra's advice/recommendations listed below  Please allow patient to read message   Call back number left if patient had further questions/concerns

## 2025-06-07 NOTE — TELEPHONE ENCOUNTER
Singular prescription sent x 1 month.  If not improving may consider adding pseudoephedrine 30 mg every 4-6 hours or 120 mg every 12 hours.  If not improving with these treatments then would recommend ENT evaluation.  If her ear congestion persists

## 2025-06-09 NOTE — TELEPHONE ENCOUNTER
RN called to patient  Per patient started Singulair last night and had two ocular migraine issues involving pressure to the right temple and eye  Also experienced zigzagging lines in her vision  States the headache went away as the day went on and now denies any symptoms  No current vision issues or headache  RN advised per Dr. Guerra if concerned about Singulair triggering migraines recommend to discontinue at this time   Patient verbalized understanding and will stop Singulair     Is asking what the next steps are to help with ear congestion and pressure?    Medications include  Xyzal 5 mg once nightly  Azelastine 0.1% 2 sprays 2 times daily  Flonase 2 sprays once daily     Per last recommendation was to try Singulair - if not improving may consider pseudoephedrine 30 mg every 4-6 hours or 120 mg every 12 hours. If not improving  with these treatments then would recommend ENT evaluation    RN advised will follow-up with Dr. Guerra and once we have further advice/recommendations will give her a call back  Patient verbalized understanding and denies further questions at this time

## 2025-06-09 NOTE — TELEPHONE ENCOUNTER
If stopping Singulair may consider nasal saline sinus rinses or adding Sudafed.  Otherwise may consider ENT evaluation

## 2025-06-09 NOTE — TELEPHONE ENCOUNTER
RN left message with Dr. Guerra's recommendations listed below.  Provided call back number if she has any additional questions or concerns.

## 2025-06-09 NOTE — TELEPHONE ENCOUNTER
cAll noted.  If patient is concerned the Singulair is triggering migraines and recommend to discontinue at this time.

## (undated) DEVICE — SOLUTION IRRIG 1000ML 0.9% NACL USP BTL

## (undated) DEVICE — TROCAR: Brand: KII® SLEEVE

## (undated) DEVICE — Device

## (undated) DEVICE — TROCAR: Brand: KII FIOS FIRST ENTRY

## (undated) DEVICE — TISSUE RETRIEVAL SYSTEM: Brand: INZII RETRIEVAL SYSTEM

## (undated) DEVICE — DRAIN SUR W10MMXL20CM SIL FULL PERF HUBLESS

## (undated) DEVICE — KIT CLEAN ENDOKIT 1.1OZ GOWNX2

## (undated) DEVICE — GLOVE SUR 6.5 SENSICARE PI MIC PIP CRM PWD F

## (undated) DEVICE — MARYLAND JAW LAPAROSCOPIC SEALER/DIVIDER COATED: Brand: LIGASURE

## (undated) DEVICE — V2 SPECIMEN COLLECTION MANIFOLD KIT: Brand: NEPTUNE

## (undated) DEVICE — CLIP APPLIER WITH CLIP LOGIC TECHNOLOGY: Brand: ENDO CLIP III

## (undated) DEVICE — GAMMEX® PI HYBRID SIZE 6.5, STERILE POWDER-FREE SURGICAL GLOVE, POLYISOPRENE AND NEOPRENE BLEND: Brand: GAMMEX

## (undated) DEVICE — UNDYED BRAIDED (POLYGLACTIN 910), SYNTHETIC ABSORBABLE SUTURE: Brand: COATED VICRYL

## (undated) DEVICE — EVACUATOR SUR 100CC SIL BLB WND

## (undated) DEVICE — [HIGH FLOW INSUFFLATOR,  DO NOT USE IF PACKAGE IS DAMAGED,  KEEP DRY,  KEEP AWAY FROM SUNLIGHT,  PROTECT FROM HEAT AND RADIOACTIVE SOURCES.]: Brand: PNEUMOSURE

## (undated) DEVICE — SUT COAT VCRL+ 0 27IN UR-6 ABSRB VLT ANTIBACT

## (undated) DEVICE — LAP CHOLE: Brand: MEDLINE INDUSTRIES, INC.

## (undated) DEVICE — TROCAR 12MM L100 HASSAN NON BLADED W/BALLOON

## (undated) DEVICE — KIT ENDO ORCAPOD 160/180/190

## (undated) DEVICE — GLOVE SUR 6.5 SENSICARE PI PIP GRN PWD F

## (undated) DEVICE — SOLUTION IRRIG 3000ML 0.9% NACL FLX CONT

## (undated) DEVICE — REM POLYHESIVE ADULT PATIENT RETURN ELECTRODE: Brand: VALLEYLAB

## (undated) DEVICE — FORCEPS BX LG 2.4MM X 240CM NDL RAD JAW 4

## (undated) DEVICE — SINGLE-USE SNARE: Brand: CAPTIVATOR™ COLD

## (undated) DEVICE — MEDI-VAC NON-CONDUCTIVE SUCTION TUBING 6MM X 1.8M (6FT.) L: Brand: CARDINAL HEALTH

## (undated) DEVICE — V2 SPECIMEN COLLECTION TRAY: Brand: NEPTUNE

## (undated) DEVICE — PLUMEPORT ACTIV LAPAROSCOPIC SMOKE FILTRATION DEVICE: Brand: PLUMEPORT ACTIVE

## (undated) DEVICE — ADHESIVE SKIN TOP FOR WND CLSR DERMBND ADV

## (undated) DEVICE — 60 ML SYRINGE REGULAR TIP: Brand: MONOJECT

## (undated) NOTE — LETTER
[facility]  [address], [place], [place]    Authorization for Surgical Operation and Procedure                               I hereby authorize Nura Horn MD, my physician and his/her assistants (if applicable), which may include medical students, residents, and/or fellows, to perform the following surgical operation/ procedure and administer such anesthesia as may be determined necessary by my physician: Operation/Procedure name (s) COLONOSCOPY on Edda Gerard   2.   I recognize that during the surgical operation/procedure, unforeseen conditions may necessitate additional or different procedures than those listed above.  I, therefore, further authorize and request that the above-named surgeon, assistants, or designees perform such procedures as are, in their judgment, necessary and desirable.    3.   My surgeon/physician has discussed prior to my surgery the potential benefits, risks and side effects of this procedure; the likelihood of achieving goals; and potential problems that might occur during recuperation.  They also discussed reasonable alternatives to the procedure, including risks, benefits, and side effects related to the alternatives and risks related to not receiving this procedure.  I have had all my questions answered and I acknowledge that no guarantee has been made as to the result that may be obtained.    4.   Should the need arise during my operation/procedure, which includes change of level of care prior to discharge, I also consent to the administration of blood and/or blood products.  Further, I understand that despite careful testing and screening of blood or blood products by collecting agencies, I may still be subject to ill effects as a result of receiving a blood transfusion and/or blood products.  The following are some, but not all, of the potential risks that can occur: fever and allergic reactions, hemolytic reactions, transmission of diseases such  as Hepatitis, AIDS and Cytomegalovirus (CMV) and fluid overload.  In the event that I wish to have an autologous transfusion of my own blood, or a directed donor transfusion, I will discuss this with my physician.  Check only if Refusing Blood or Blood Products  I understand refusal of blood or blood products as deemed necessary by my physician may have serious consequences to my condition to include possible death. I hereby assume responsibility for my refusal and release the hospital, its personnel, and my physicians from any responsibility for the consequences of my refusal.    o  Refuse   5.   I authorize the use of any specimen, organs, tissues, body parts or foreign objects that may be removed from my body during the operation/procedure for diagnosis, research or teaching purposes and their subsequent disposal by hospital authorities.  I also authorize the release of specimen test results and/or written reports to my treating physician on the hospital medical staff or other referring or consulting physicians involved in my care, at the discretion of the Pathologist or my treating physician.    6.   I consent to the photographing or videotaping of the operations or procedures to be performed, including appropriate portions of my body for medical, scientific, or educational purposes, provided my identity is not revealed by the pictures or by descriptive texts accompanying them.  If the procedure has been photographed/videotaped, the surgeon will obtain the original picture, image, videotape or CD.  The hospital will not be responsible for storage, release or maintenance of the picture, image, tape or CD.    7.   I consent to the presence of a  or observers in the operating room as deemed necessary by my physician or their designees.    8.   I recognize that in the event my procedure results in extended X-Ray/fluoroscopy time, I may develop a skin reaction.    9. If I have a Do Not Attempt  Resuscitation (DNAR) order in place, that status will be suspended while in the operating room, procedural suite, and during the recovery period unless otherwise explicitly stated by me (or a person authorized to consent on my behalf). The surgeon or my attending physician will determine when the applicable recovery period ends for purposes of reinstating the DNAR order.  10. Patients having a sterilization procedure: I understand that if the procedure is successful the results will be permanent and it will therefore be impossible for me to inseminate, conceive, or bear children.  I also understand that the procedure is intended to result in sterility, although the result has not been guaranteed.   11. I acknowledge that my physician has explained sedation/analgesia administration to me including the risk and benefits I consent to the administration of sedation/analgesia as may be necessary or desirable in the judgment of my physician.    I CERTIFY THAT I HAVE READ AND FULLY UNDERSTAND THE ABOVE CONSENT TO OPERATION and/or OTHER PROCEDURE.     ____________________________________  _________________________________        ______________________________  Signature of Patient    Signature of Responsible Person                Printed Name of Responsible Person                                      ____________________________________  _____________________________                ________________________________  Signature of Witness        Date  Time         Relationship to Patient    STATEMENT OF PHYSICIAN My signature below affirms that prior to the time of the procedure; I have explained to the patient and/or his/her legal representative, the risks and benefits involved in the proposed treatment and any reasonable alternative to the proposed treatment. I have also explained the risks and benefits involved in refusal of the proposed treatment and alternatives to the proposed treatment and have answered the patient's  questions. If I have a significant financial interest in a co-management agreement or a significant financial interest in any product or implant, or other significant relationship used in this procedure/surgery, I have disclosed this and had a discussion with my patient.     _____________________________________________________              _____________________________  (Signature of Physician)                                                                                         (Date)                                   (Time)  Patient Name: Edda Gerard      : 1975      Printed: 2025     Medical Record #: [id number]                                      Page 1 of 1

## (undated) NOTE — LETTER
[facility]  Administration of Anesthesia  I, Edda Gerard agree to be cared for by a physician anesthesiologist alone and/or with a nurse anesthetist, who is specially trained to monitor me and give me medicine to put me to sleep or keep me comfortable during my procedure    I understand that my anesthesiologist and/or anesthetist is not an employee or agent of [facility] or [employer]. He or she works for [facility]    As the patient asking for anesthesia services, I agree to:  Allow the anesthesiologist (anesthesia doctor) to give me medicine and do additional procedures as necessary. Some examples are: Starting or using an “IV” to give me medicine, fluids or blood during my procedure, and having a breathing tube placed to help me breathe when I’m asleep (intubation). In the event that my heart stops working properly, I understand that my anesthesiologist will make every effort to sustain my life, unless otherwise directed by [facility] Do Not Resuscitate documents.  Tell my anesthesia doctor before my procedure:  If I am pregnant.  The last time that I ate or drank.  iii. All of the medicines I take (including prescriptions, herbal supplements, and pills I can buy without a prescription (including street drugs/illegal medications). Failure to inform my anesthesiologist about these medicines may increase my risk of anesthetic complications.  iv.If I am allergic to anything or have had a reaction to anesthesia before.  I understand how the anesthesia medicine will help me (benefits).  I understand that with any type of anesthesia medicine there are risks:  The most common risks are: nausea, vomiting, sore throat, muscle soreness, damage to my eyes, mouth, or teeth (from breathing tube placement).  Rare risks include: remembering what happened during my procedure, allergic reactions to medications, injury to my airway, heart, lungs, vision, nerves, or  muscles and in extremely rare instances death.  My doctor has explained to me other choices available to me for my care (alternatives).  Pregnant Patients (“epidural”):  I understand that the risks of having an epidural (medicine given into my back to help control pain during labor), include itching, low blood pressure, difficulty urinating, headache or slowing of the baby’s heart. Very rare risks include infection, bleeding, seizure, irregular heart rhythms and nerve injury.  Regional Anesthesia (“spinal”, “epidural”, & “nerve blocks”):  I understand that rare but potential complications include headache, bleeding, infection, seizure, irregular heart rhythms, and nerve injury.    _____________________________________________________________________________  Patient (or Representative) Signature/Relationship to Patient  Date   Time    _____________________________________________________________________________   Name (if used)    Language/Organization   Time    _____________________________________________________________________________  Nurse Anesthetist Signature     Date   Time  _____________________________________________________________________________  Anesthesiologist Signature     Date   Time  I have discussed the procedure and information above with the patient (or patient’s representative) and answered their questions. The patient or their representative has agreed to have anesthesia services.    _____________________________________________________________________________  Witness        Date   Time  I have verified that the signature is that of the patient or patient’s representative, and that it was signed before the procedure  Patient Name: Edda Gerard     : 1975                 Printed: 2025 at 9:08 AM    Medical Record #: [id number]                                            Page 1 of 1  ----------ANESTHESIA CONSENT----------

## (undated) NOTE — LETTER
AUTHORIZATION FOR SURGICAL OPERATION OR OTHER PROCEDURE    1.  I hereby authorize Dr. Cindy Hidalgo, and [facility], [place], [facility] staff assigned to my case to perform the following operation and/or procedure at the [facility], [place], [facility]:    Cortisone injection to left foot Time:  ________ A. M.  P.M.        Patient Name:  ______________________________________________________  (please print)      Patient signature:  ___________________________________________________             Relationship to Patient: